# Patient Record
Sex: MALE | Race: WHITE
[De-identification: names, ages, dates, MRNs, and addresses within clinical notes are randomized per-mention and may not be internally consistent; named-entity substitution may affect disease eponyms.]

---

## 2018-01-24 ENCOUNTER — HOSPITAL ENCOUNTER (INPATIENT)
Dept: HOSPITAL 94 - ER | Age: 83
LOS: 2 days | Discharge: HOME | DRG: 682 | End: 2018-01-26
Attending: INTERNAL MEDICINE | Admitting: INTERNAL MEDICINE
Payer: MEDICARE

## 2018-01-24 VITALS — DIASTOLIC BLOOD PRESSURE: 51 MMHG | SYSTOLIC BLOOD PRESSURE: 105 MMHG

## 2018-01-24 VITALS — SYSTOLIC BLOOD PRESSURE: 106 MMHG | DIASTOLIC BLOOD PRESSURE: 55 MMHG

## 2018-01-24 VITALS — SYSTOLIC BLOOD PRESSURE: 108 MMHG | DIASTOLIC BLOOD PRESSURE: 71 MMHG

## 2018-01-24 VITALS — DIASTOLIC BLOOD PRESSURE: 44 MMHG | SYSTOLIC BLOOD PRESSURE: 101 MMHG

## 2018-01-24 VITALS — DIASTOLIC BLOOD PRESSURE: 60 MMHG | SYSTOLIC BLOOD PRESSURE: 122 MMHG

## 2018-01-24 VITALS — SYSTOLIC BLOOD PRESSURE: 108 MMHG | DIASTOLIC BLOOD PRESSURE: 55 MMHG

## 2018-01-24 VITALS — DIASTOLIC BLOOD PRESSURE: 61 MMHG | SYSTOLIC BLOOD PRESSURE: 112 MMHG

## 2018-01-24 VITALS — SYSTOLIC BLOOD PRESSURE: 123 MMHG | DIASTOLIC BLOOD PRESSURE: 63 MMHG

## 2018-01-24 VITALS — DIASTOLIC BLOOD PRESSURE: 61 MMHG | SYSTOLIC BLOOD PRESSURE: 107 MMHG

## 2018-01-24 VITALS — SYSTOLIC BLOOD PRESSURE: 113 MMHG | DIASTOLIC BLOOD PRESSURE: 50 MMHG

## 2018-01-24 VITALS — SYSTOLIC BLOOD PRESSURE: 100 MMHG | DIASTOLIC BLOOD PRESSURE: 35 MMHG

## 2018-01-24 VITALS — SYSTOLIC BLOOD PRESSURE: 107 MMHG | DIASTOLIC BLOOD PRESSURE: 46 MMHG

## 2018-01-24 VITALS — HEIGHT: 72 IN | WEIGHT: 210.32 LBS | BODY MASS INDEX: 28.49 KG/M2

## 2018-01-24 VITALS — DIASTOLIC BLOOD PRESSURE: 46 MMHG | SYSTOLIC BLOOD PRESSURE: 101 MMHG

## 2018-01-24 VITALS — DIASTOLIC BLOOD PRESSURE: 60 MMHG | SYSTOLIC BLOOD PRESSURE: 121 MMHG

## 2018-01-24 VITALS — SYSTOLIC BLOOD PRESSURE: 109 MMHG | DIASTOLIC BLOOD PRESSURE: 63 MMHG

## 2018-01-24 VITALS — DIASTOLIC BLOOD PRESSURE: 43 MMHG | SYSTOLIC BLOOD PRESSURE: 115 MMHG

## 2018-01-24 VITALS — SYSTOLIC BLOOD PRESSURE: 109 MMHG | DIASTOLIC BLOOD PRESSURE: 59 MMHG

## 2018-01-24 DIAGNOSIS — I50.9: ICD-10-CM

## 2018-01-24 DIAGNOSIS — D61.818: ICD-10-CM

## 2018-01-24 DIAGNOSIS — N17.9: Primary | ICD-10-CM

## 2018-01-24 DIAGNOSIS — N18.9: ICD-10-CM

## 2018-01-24 DIAGNOSIS — M19.90: ICD-10-CM

## 2018-01-24 DIAGNOSIS — D46.9: ICD-10-CM

## 2018-01-24 DIAGNOSIS — Z87.891: ICD-10-CM

## 2018-01-24 DIAGNOSIS — Z79.01: ICD-10-CM

## 2018-01-24 DIAGNOSIS — Z86.73: ICD-10-CM

## 2018-01-24 DIAGNOSIS — D62: ICD-10-CM

## 2018-01-24 DIAGNOSIS — D73.1: ICD-10-CM

## 2018-01-24 DIAGNOSIS — J44.9: ICD-10-CM

## 2018-01-24 DIAGNOSIS — D68.69: ICD-10-CM

## 2018-01-24 DIAGNOSIS — I48.91: ICD-10-CM

## 2018-01-24 DIAGNOSIS — Z88.8: ICD-10-CM

## 2018-01-24 DIAGNOSIS — K31.811: ICD-10-CM

## 2018-01-24 DIAGNOSIS — Z79.899: ICD-10-CM

## 2018-01-24 DIAGNOSIS — I13.0: ICD-10-CM

## 2018-01-24 LAB
ALBUMIN SERPL BCP-MCNC: 3.5 G/DL (ref 3.4–5)
ALBUMIN/GLOB SERPL: 1 {RATIO} (ref 1.1–1.5)
ALP SERPL-CCNC: 69 IU/L (ref 46–116)
ALT SERPL W P-5'-P-CCNC: 19 U/L (ref 12–78)
ANION GAP SERPL CALCULATED.3IONS-SCNC: 9 MMOL/L (ref 8–16)
AST SERPL W P-5'-P-CCNC: 17 U/L (ref 10–37)
BASOPHILS # BLD AUTO: 0 X10'3 (ref 0–0.2)
BASOPHILS NFR BLD AUTO: 0.1 % (ref 0–1)
BILIRUB SERPL-MCNC: 0.5 MG/DL (ref 0.1–1)
BUN SERPL-MCNC: 68 MG/DL (ref 7–18)
BUN/CREAT SERPL: 35.8 (ref 5.4–32)
CALCIUM SERPL-MCNC: 8.8 MG/DL (ref 8.5–10.1)
CHLORIDE SERPL-SCNC: 109 MMOL/L (ref 99–107)
CO2 SERPL-SCNC: 24.7 MMOL/L (ref 24–32)
CREAT SERPL-MCNC: 1.9 MG/DL (ref 0.6–1.1)
EOSINOPHIL # BLD AUTO: 0.1 X10'3 (ref 0–0.9)
EOSINOPHIL NFR BLD AUTO: 5.7 % (ref 0–6)
EOSINOPHIL NFR BLD MANUAL: 6 % (ref 0–6)
ERYTHROCYTE [DISTWIDTH] IN BLOOD BY AUTOMATED COUNT: 17.1 % (ref 11.5–14.5)
GFR SERPL CREATININE-BSD FRML MDRD: 34 ML/MIN
GLUCOSE SERPL-MCNC: 135 MG/DL (ref 70–104)
HCT VFR BLD AUTO: 18.1 % (ref 42–52)
HEMOCCULT STL QL: POSITIVE
HGB BLD-MCNC: 6.3 G/DL (ref 14–17.9)
INR PPP: 3.9 INR
LYMPHOCYTES # BLD AUTO: 0.2 X10'3 (ref 1.1–4.8)
LYMPHOCYTES NFR BLD AUTO: 8 % (ref 21–51)
LYMPHOCYTES NFR BLD MANUAL: 5 % (ref 21–51)
MCH RBC QN AUTO: 29.7 PG (ref 27–31)
MCHC RBC AUTO-ENTMCNC: 34.6 % (ref 33–36.5)
MCV RBC AUTO: 85.9 FL (ref 78–98)
MONOCYTES # BLD AUTO: 0.2 X10'3 (ref 0–0.9)
MONOCYTES NFR BLD AUTO: 8 % (ref 2–12)
MONOCYTES NFR BLD MANUAL: 5 % (ref 2–12)
NEUTROPHILS # BLD AUTO: 1.7 X10'3 (ref 1.8–7.7)
NEUTROPHILS NFR BLD AUTO: 78.2 % (ref 42–75)
NEUTS SEG NFR BLD MANUAL: 84 % (ref 42–75)
PLATELET # BLD AUTO: 119 X10'3 (ref 140–440)
PLATELET BLD QL SMEAR: (no result)
PMV BLD AUTO: 7.3 FL (ref 7.4–10.4)
POTASSIUM SERPL-SCNC: 4.7 MMOL/L (ref 3.5–5.1)
PROT SERPL-MCNC: 7 G/DL (ref 6.4–8.2)
PROTHROMBIN TIME: 38.6 SECONDS (ref 9–12)
RBC # BLD AUTO: 2.11 X10'6 (ref 4.7–6.1)
RBC MORPH BLD: NORMAL
SODIUM SERPL-SCNC: 143 MMOL/L (ref 135–145)
TOTAL CELLS COUNTED FLD: 100
WBC # BLD AUTO: 2.1 X10'3 (ref 4.5–11)

## 2018-01-24 PROCEDURE — 84484 ASSAY OF TROPONIN QUANT: CPT

## 2018-01-24 PROCEDURE — 83880 ASSAY OF NATRIURETIC PEPTIDE: CPT

## 2018-01-24 PROCEDURE — 82272 OCCULT BLD FECES 1-3 TESTS: CPT

## 2018-01-24 PROCEDURE — 86901 BLOOD TYPING SEROLOGIC RH(D): CPT

## 2018-01-24 PROCEDURE — 30233K1 TRANSFUSION OF NONAUTOLOGOUS FROZEN PLASMA INTO PERIPHERAL VEIN, PERCUTANEOUS APPROACH: ICD-10-PCS | Performed by: INTERNAL MEDICINE

## 2018-01-24 PROCEDURE — 0W3P8ZZ CONTROL BLEEDING IN GASTROINTESTINAL TRACT, VIA NATURAL OR ARTIFICIAL OPENING ENDOSCOPIC: ICD-10-PCS | Performed by: INTERNAL MEDICINE

## 2018-01-24 PROCEDURE — 36415 COLL VENOUS BLD VENIPUNCTURE: CPT

## 2018-01-24 PROCEDURE — 80053 COMPREHEN METABOLIC PANEL: CPT

## 2018-01-24 PROCEDURE — 85610 PROTHROMBIN TIME: CPT

## 2018-01-24 PROCEDURE — 99291 CRITICAL CARE FIRST HOUR: CPT

## 2018-01-24 PROCEDURE — 83735 ASSAY OF MAGNESIUM: CPT

## 2018-01-24 PROCEDURE — 86885 COOMBS TEST INDIRECT QUAL: CPT

## 2018-01-24 PROCEDURE — 87070 CULTURE OTHR SPECIMN AEROBIC: CPT

## 2018-01-24 PROCEDURE — 30233N1 TRANSFUSION OF NONAUTOLOGOUS RED BLOOD CELLS INTO PERIPHERAL VEIN, PERCUTANEOUS APPROACH: ICD-10-PCS | Performed by: INTERNAL MEDICINE

## 2018-01-24 PROCEDURE — 93005 ELECTROCARDIOGRAM TRACING: CPT

## 2018-01-24 PROCEDURE — 85025 COMPLETE CBC W/AUTO DIFF WBC: CPT

## 2018-01-24 PROCEDURE — 86920 COMPATIBILITY TEST SPIN: CPT

## 2018-01-24 PROCEDURE — 86900 BLOOD TYPING SEROLOGIC ABO: CPT

## 2018-01-24 PROCEDURE — 71045 X-RAY EXAM CHEST 1 VIEW: CPT

## 2018-01-24 PROCEDURE — 80048 BASIC METABOLIC PNL TOTAL CA: CPT

## 2018-01-24 RX ADMIN — LATANOPROST SCH DROP: 50 SOLUTION OPHTHALMIC at 21:00

## 2018-01-24 RX ADMIN — SODIUM CHLORIDE SCH MLS/HR: 9 INJECTION INTRAMUSCULAR; INTRAVENOUS; SUBCUTANEOUS at 16:24

## 2018-01-25 VITALS — SYSTOLIC BLOOD PRESSURE: 120 MMHG | DIASTOLIC BLOOD PRESSURE: 62 MMHG

## 2018-01-25 VITALS — DIASTOLIC BLOOD PRESSURE: 42 MMHG | SYSTOLIC BLOOD PRESSURE: 100 MMHG

## 2018-01-25 VITALS — SYSTOLIC BLOOD PRESSURE: 126 MMHG | DIASTOLIC BLOOD PRESSURE: 69 MMHG

## 2018-01-25 VITALS — DIASTOLIC BLOOD PRESSURE: 53 MMHG | SYSTOLIC BLOOD PRESSURE: 107 MMHG

## 2018-01-25 VITALS — SYSTOLIC BLOOD PRESSURE: 106 MMHG | DIASTOLIC BLOOD PRESSURE: 49 MMHG

## 2018-01-25 VITALS — DIASTOLIC BLOOD PRESSURE: 60 MMHG | SYSTOLIC BLOOD PRESSURE: 102 MMHG

## 2018-01-25 VITALS — SYSTOLIC BLOOD PRESSURE: 99 MMHG | DIASTOLIC BLOOD PRESSURE: 52 MMHG

## 2018-01-25 VITALS — DIASTOLIC BLOOD PRESSURE: 58 MMHG | SYSTOLIC BLOOD PRESSURE: 106 MMHG

## 2018-01-25 VITALS — DIASTOLIC BLOOD PRESSURE: 56 MMHG | SYSTOLIC BLOOD PRESSURE: 115 MMHG

## 2018-01-25 VITALS — SYSTOLIC BLOOD PRESSURE: 111 MMHG | DIASTOLIC BLOOD PRESSURE: 48 MMHG

## 2018-01-25 VITALS — SYSTOLIC BLOOD PRESSURE: 105 MMHG | DIASTOLIC BLOOD PRESSURE: 54 MMHG

## 2018-01-25 VITALS — DIASTOLIC BLOOD PRESSURE: 49 MMHG | SYSTOLIC BLOOD PRESSURE: 105 MMHG

## 2018-01-25 VITALS — DIASTOLIC BLOOD PRESSURE: 70 MMHG | SYSTOLIC BLOOD PRESSURE: 140 MMHG

## 2018-01-25 VITALS — SYSTOLIC BLOOD PRESSURE: 109 MMHG | DIASTOLIC BLOOD PRESSURE: 55 MMHG

## 2018-01-25 VITALS — SYSTOLIC BLOOD PRESSURE: 105 MMHG | DIASTOLIC BLOOD PRESSURE: 53 MMHG

## 2018-01-25 LAB
ALBUMIN SERPL BCP-MCNC: 3.1 G/DL (ref 3.4–5)
ANION GAP SERPL CALCULATED.3IONS-SCNC: 9 MMOL/L (ref 8–16)
ANISOCYTOSIS BLD QL SMEAR: (no result)
BASOPHILS # BLD AUTO: 0 X10'3 (ref 0–0.2)
BASOPHILS NFR BLD AUTO: 0.1 % (ref 0–1)
BUN SERPL-MCNC: 64 MG/DL (ref 7–18)
BUN/CREAT SERPL: 35.6 (ref 5.4–32)
CALCIUM SERPL-MCNC: 8.6 MG/DL (ref 8.5–10.1)
CHLORIDE SERPL-SCNC: 112 MMOL/L (ref 99–107)
CO2 SERPL-SCNC: 24.2 MMOL/L (ref 24–32)
CREAT SERPL-MCNC: 1.8 MG/DL (ref 0.6–1.1)
EOSINOPHIL # BLD AUTO: 0.1 X10'3 (ref 0–0.9)
EOSINOPHIL NFR BLD AUTO: 5.7 % (ref 0–6)
EOSINOPHIL NFR BLD MANUAL: 6 % (ref 0–6)
ERYTHROCYTE [DISTWIDTH] IN BLOOD BY AUTOMATED COUNT: 16.6 % (ref 11.5–14.5)
GFR SERPL CREATININE-BSD FRML MDRD: 36 ML/MIN
GLUCOSE SERPL-MCNC: 89 MG/DL (ref 70–104)
HCT VFR BLD AUTO: 20.5 % (ref 42–52)
HGB BLD-MCNC: 6.9 G/DL (ref 14–17.9)
LYMPHOCYTES # BLD AUTO: 0.2 X10'3 (ref 1.1–4.8)
LYMPHOCYTES NFR BLD AUTO: 8.6 % (ref 21–51)
LYMPHOCYTES NFR BLD MANUAL: 6 % (ref 21–51)
MAGNESIUM SERPL-MCNC: 2.3 MG/DL (ref 1.5–2.4)
MCH RBC QN AUTO: 29.7 PG (ref 27–31)
MCHC RBC AUTO-ENTMCNC: 33.4 % (ref 33–36.5)
MCV RBC AUTO: 89 FL (ref 78–98)
MONOCYTES # BLD AUTO: 0.2 X10'3 (ref 0–0.9)
MONOCYTES NFR BLD AUTO: 11.4 % (ref 2–12)
MONOCYTES NFR BLD MANUAL: 15 % (ref 2–12)
NEUTROPHILS # BLD AUTO: 1.5 X10'3 (ref 1.8–7.7)
NEUTROPHILS NFR BLD AUTO: 74.2 % (ref 42–75)
NEUTS SEG NFR BLD MANUAL: 73 % (ref 42–75)
PLATELET # BLD AUTO: 95 X10'3 (ref 140–440)
PLATELET BLD QL SMEAR: (no result)
PMV BLD AUTO: 7.5 FL (ref 7.4–10.4)
POTASSIUM SERPL-SCNC: 5.1 MMOL/L (ref 3.5–5.1)
RBC # BLD AUTO: 2.31 X10'6 (ref 4.7–6.1)
RBC MORPH BLD: (no result)
SMUDGE CELLS BLD QL SMEAR: (no result)
SODIUM SERPL-SCNC: 145 MMOL/L (ref 135–145)
TOTAL CELLS COUNTED FLD: 100
WBC # BLD AUTO: 2 X10'3 (ref 4.5–11)

## 2018-01-25 RX ADMIN — SODIUM CHLORIDE SCH MLS/HR: 9 INJECTION INTRAMUSCULAR; INTRAVENOUS; SUBCUTANEOUS at 12:44

## 2018-01-25 RX ADMIN — LATANOPROST SCH DROP: 50 SOLUTION OPHTHALMIC at 20:38

## 2018-01-25 RX ADMIN — SODIUM CHLORIDE SCH MLS/HR: 9 INJECTION INTRAMUSCULAR; INTRAVENOUS; SUBCUTANEOUS at 23:44

## 2018-01-26 VITALS — DIASTOLIC BLOOD PRESSURE: 70 MMHG | SYSTOLIC BLOOD PRESSURE: 134 MMHG

## 2018-01-26 VITALS — SYSTOLIC BLOOD PRESSURE: 118 MMHG | DIASTOLIC BLOOD PRESSURE: 55 MMHG

## 2018-01-26 VITALS — DIASTOLIC BLOOD PRESSURE: 63 MMHG | SYSTOLIC BLOOD PRESSURE: 113 MMHG

## 2018-01-26 LAB
ALBUMIN SERPL BCP-MCNC: 3.3 G/DL (ref 3.4–5)
ANION GAP SERPL CALCULATED.3IONS-SCNC: 10 MMOL/L (ref 8–16)
ANISOCYTOSIS BLD QL SMEAR: (no result)
BASOPHILS # BLD AUTO: (no result) X10'3 (ref 0–0.2)
BASOPHILS NFR BLD AUTO: (no result) % (ref 0–1)
BUN SERPL-MCNC: 50 MG/DL (ref 7–18)
BUN/CREAT SERPL: 27.8 (ref 5.4–32)
CALCIUM SERPL-MCNC: 8.9 MG/DL (ref 8.5–10.1)
CHLORIDE SERPL-SCNC: 115 MMOL/L (ref 99–107)
CO2 SERPL-SCNC: 22.5 MMOL/L (ref 24–32)
CREAT SERPL-MCNC: 1.8 MG/DL (ref 0.6–1.1)
EOSINOPHIL # BLD AUTO: (no result) X10'3 (ref 0–0.9)
EOSINOPHIL NFR BLD AUTO: (no result) % (ref 0–6)
ERYTHROCYTE [DISTWIDTH] IN BLOOD BY AUTOMATED COUNT: 16.8 % (ref 11.5–14.5)
GFR SERPL CREATININE-BSD FRML MDRD: 36 ML/MIN
GLUCOSE SERPL-MCNC: 92 MG/DL (ref 70–104)
HCT VFR BLD AUTO: 26.3 % (ref 42–52)
HGB BLD-MCNC: 9.1 G/DL (ref 14–17.9)
INR PPP: 2.7 INR
LYMPHOCYTES # BLD AUTO: (no result) X10'3 (ref 1.1–4.8)
LYMPHOCYTES NFR BLD AUTO: (no result) % (ref 21–51)
LYMPHOCYTES NFR BLD MANUAL: 6 % (ref 21–51)
MAGNESIUM SERPL-MCNC: 2.2 MG/DL (ref 1.5–2.4)
MCH RBC QN AUTO: 30.4 PG (ref 27–31)
MCHC RBC AUTO-ENTMCNC: 34.5 % (ref 33–36.5)
MCV RBC AUTO: 88.1 FL (ref 78–98)
MONOCYTES # BLD AUTO: (no result) X10'3 (ref 0–0.9)
MONOCYTES NFR BLD AUTO: (no result) % (ref 2–12)
MONOCYTES NFR BLD MANUAL: 4 % (ref 2–12)
NEUTROPHILS # BLD AUTO: (no result) X10'3 (ref 1.8–7.7)
NEUTROPHILS NFR BLD AUTO: (no result) % (ref 42–75)
NEUTS SEG NFR BLD MANUAL: 90 % (ref 42–75)
PLATELET # BLD AUTO: 96 X10'3 (ref 140–440)
PLATELET BLD QL SMEAR: (no result)
PMV BLD AUTO: 7.6 FL (ref 7.4–10.4)
POTASSIUM SERPL-SCNC: 4.8 MMOL/L (ref 3.5–5.1)
PROTHROMBIN TIME: 27.2 SECONDS (ref 9–12)
RBC # BLD AUTO: 2.98 X10'6 (ref 4.7–6.1)
RBC MORPH BLD: (no result)
SODIUM SERPL-SCNC: 147 MMOL/L (ref 135–145)
TOTAL CELLS COUNTED FLD: 100
WBC # BLD AUTO: 3 X10'3 (ref 4.5–11)

## 2018-01-26 RX ADMIN — SODIUM CHLORIDE SCH MLS/HR: 9 INJECTION INTRAMUSCULAR; INTRAVENOUS; SUBCUTANEOUS at 04:01

## 2018-01-26 RX ADMIN — SODIUM CHLORIDE SCH MLS/HR: 9 INJECTION INTRAMUSCULAR; INTRAVENOUS; SUBCUTANEOUS at 11:15

## 2018-07-12 ENCOUNTER — HOSPITAL ENCOUNTER (OUTPATIENT)
Dept: HOSPITAL 94 - SSTAY O | Age: 83
Discharge: HOME | End: 2018-07-12
Attending: INTERNAL MEDICINE
Payer: MEDICARE

## 2018-07-12 VITALS — DIASTOLIC BLOOD PRESSURE: 60 MMHG | SYSTOLIC BLOOD PRESSURE: 97 MMHG

## 2018-07-12 VITALS — HEIGHT: 72 IN | WEIGHT: 215.61 LBS | BODY MASS INDEX: 29.2 KG/M2

## 2018-07-12 VITALS — DIASTOLIC BLOOD PRESSURE: 60 MMHG | SYSTOLIC BLOOD PRESSURE: 104 MMHG

## 2018-07-12 VITALS — SYSTOLIC BLOOD PRESSURE: 93 MMHG | DIASTOLIC BLOOD PRESSURE: 57 MMHG

## 2018-07-12 VITALS — SYSTOLIC BLOOD PRESSURE: 105 MMHG | DIASTOLIC BLOOD PRESSURE: 54 MMHG

## 2018-07-12 VITALS — DIASTOLIC BLOOD PRESSURE: 62 MMHG | SYSTOLIC BLOOD PRESSURE: 123 MMHG

## 2018-07-12 VITALS — SYSTOLIC BLOOD PRESSURE: 97 MMHG | DIASTOLIC BLOOD PRESSURE: 60 MMHG

## 2018-07-12 VITALS — SYSTOLIC BLOOD PRESSURE: 118 MMHG | DIASTOLIC BLOOD PRESSURE: 62 MMHG

## 2018-07-12 VITALS — SYSTOLIC BLOOD PRESSURE: 104 MMHG | DIASTOLIC BLOOD PRESSURE: 56 MMHG

## 2018-07-12 VITALS — SYSTOLIC BLOOD PRESSURE: 106 MMHG | DIASTOLIC BLOOD PRESSURE: 63 MMHG

## 2018-07-12 DIAGNOSIS — Z98.42: ICD-10-CM

## 2018-07-12 DIAGNOSIS — Z79.01: ICD-10-CM

## 2018-07-12 DIAGNOSIS — Z79.899: ICD-10-CM

## 2018-07-12 DIAGNOSIS — Z98.890: ICD-10-CM

## 2018-07-12 DIAGNOSIS — N40.0: ICD-10-CM

## 2018-07-12 DIAGNOSIS — M19.90: ICD-10-CM

## 2018-07-12 DIAGNOSIS — I48.91: ICD-10-CM

## 2018-07-12 DIAGNOSIS — D46.20: Primary | ICD-10-CM

## 2018-07-12 DIAGNOSIS — K21.9: ICD-10-CM

## 2018-07-12 DIAGNOSIS — I11.0: ICD-10-CM

## 2018-07-12 DIAGNOSIS — Z88.8: ICD-10-CM

## 2018-07-12 DIAGNOSIS — I50.9: ICD-10-CM

## 2018-07-12 DIAGNOSIS — Z72.89: ICD-10-CM

## 2018-07-12 DIAGNOSIS — Z87.891: ICD-10-CM

## 2018-07-12 DIAGNOSIS — F32.9: ICD-10-CM

## 2018-07-12 DIAGNOSIS — Z86.73: ICD-10-CM

## 2018-07-12 DIAGNOSIS — Z95.5: ICD-10-CM

## 2018-07-12 DIAGNOSIS — G47.33: ICD-10-CM

## 2018-07-12 DIAGNOSIS — J44.9: ICD-10-CM

## 2018-07-12 DIAGNOSIS — Z98.41: ICD-10-CM

## 2018-07-12 PROCEDURE — 86644 CMV ANTIBODY: CPT

## 2018-07-12 PROCEDURE — 86901 BLOOD TYPING SEROLOGIC RH(D): CPT

## 2018-07-12 PROCEDURE — 86885 COOMBS TEST INDIRECT QUAL: CPT

## 2018-07-12 PROCEDURE — 36430 TRANSFUSION BLD/BLD COMPNT: CPT

## 2018-07-12 PROCEDURE — 86922 COMPATIBILITY TEST ANTIGLOB: CPT

## 2018-07-12 PROCEDURE — 86945 BLOOD PRODUCT/IRRADIATION: CPT

## 2018-07-12 PROCEDURE — 36415 COLL VENOUS BLD VENIPUNCTURE: CPT

## 2018-07-12 PROCEDURE — 86900 BLOOD TYPING SEROLOGIC ABO: CPT

## 2018-08-13 ENCOUNTER — HOSPITAL ENCOUNTER (INPATIENT)
Dept: HOSPITAL 94 - ER | Age: 83
LOS: 4 days | Discharge: NURSING, REHAB OR LONG-TERM CARE | DRG: 683 | End: 2018-08-17
Attending: FAMILY MEDICINE | Admitting: INTERNAL MEDICINE
Payer: MEDICARE

## 2018-08-13 VITALS — SYSTOLIC BLOOD PRESSURE: 124 MMHG | DIASTOLIC BLOOD PRESSURE: 41 MMHG

## 2018-08-13 VITALS — SYSTOLIC BLOOD PRESSURE: 108 MMHG | DIASTOLIC BLOOD PRESSURE: 93 MMHG

## 2018-08-13 VITALS — HEIGHT: 73 IN | BODY MASS INDEX: 25.63 KG/M2 | WEIGHT: 193.41 LBS

## 2018-08-13 DIAGNOSIS — I13.0: ICD-10-CM

## 2018-08-13 DIAGNOSIS — C84.10: ICD-10-CM

## 2018-08-13 DIAGNOSIS — I47.2: ICD-10-CM

## 2018-08-13 DIAGNOSIS — C84.09: ICD-10-CM

## 2018-08-13 DIAGNOSIS — R33.9: ICD-10-CM

## 2018-08-13 DIAGNOSIS — N18.4: ICD-10-CM

## 2018-08-13 DIAGNOSIS — D46.9: ICD-10-CM

## 2018-08-13 DIAGNOSIS — J44.9: ICD-10-CM

## 2018-08-13 DIAGNOSIS — I48.91: ICD-10-CM

## 2018-08-13 DIAGNOSIS — Z79.01: ICD-10-CM

## 2018-08-13 DIAGNOSIS — D61.818: ICD-10-CM

## 2018-08-13 DIAGNOSIS — Z86.73: ICD-10-CM

## 2018-08-13 DIAGNOSIS — H91.90: ICD-10-CM

## 2018-08-13 DIAGNOSIS — I95.9: ICD-10-CM

## 2018-08-13 DIAGNOSIS — I50.30: ICD-10-CM

## 2018-08-13 DIAGNOSIS — N17.9: Primary | ICD-10-CM

## 2018-08-13 DIAGNOSIS — L29.9: ICD-10-CM

## 2018-08-13 DIAGNOSIS — Z79.899: ICD-10-CM

## 2018-08-13 LAB
ALBUMIN SERPL BCP-MCNC: 3.4 G/DL (ref 3.4–5)
ALBUMIN/GLOB SERPL: 0.8 {RATIO} (ref 1.1–1.5)
ALP SERPL-CCNC: 99 IU/L (ref 46–116)
ALT SERPL W P-5'-P-CCNC: 21 U/L (ref 12–78)
ANION GAP SERPL CALCULATED.3IONS-SCNC: 10 MMOL/L (ref 8–16)
AST SERPL W P-5'-P-CCNC: 21 U/L (ref 10–37)
BACTERIA URNS QL MICRO: (no result) /HPF
BASOPHILS # BLD AUTO: 0 X10'3 (ref 0–0.2)
BASOPHILS NFR BLD AUTO: 0.1 % (ref 0–1)
BILIRUB SERPL-MCNC: 0.7 MG/DL (ref 0.1–1)
BUN SERPL-MCNC: 132 MG/DL (ref 7–18)
BUN/CREAT SERPL: 43.1 (ref 5.4–32)
CALCIUM SERPL-MCNC: 9.1 MG/DL (ref 8.5–10.1)
CHLORIDE SERPL-SCNC: 106 MMOL/L (ref 99–107)
CLARITY UR: CLEAR
CO2 SERPL-SCNC: 21.1 MMOL/L (ref 24–32)
COLOR UR: YELLOW
CREAT SERPL-MCNC: 3.06 MG/DL (ref 0.6–1.1)
DEPRECATED SQUAMOUS URNS QL MICRO: (no result) /LPF
EOSINOPHIL # BLD AUTO: 2.3 X10'3 (ref 0–0.9)
EOSINOPHIL NFR BLD AUTO: 39.1 % (ref 0–6)
ERYTHROCYTE [DISTWIDTH] IN BLOOD BY AUTOMATED COUNT: 16.8 % (ref 11.5–14.5)
GFR SERPL CREATININE-BSD FRML MDRD: 19 ML/MIN
GLUCOSE SERPL-MCNC: 115 MG/DL (ref 70–104)
GLUCOSE UR STRIP-MCNC: NEGATIVE MG/DL
HCT VFR BLD AUTO: 27.4 % (ref 42–52)
HGB BLD-MCNC: 9.3 G/DL (ref 14–17.9)
HGB UR QL STRIP: NEGATIVE
HYALINE CASTS URNS QL MICRO: (no result) /LPF
KETONES UR STRIP-MCNC: NEGATIVE MG/DL
LEUKOCYTE ESTERASE UR QL STRIP: (no result)
LYMPHOCYTES # BLD AUTO: 0.2 X10'3 (ref 1.1–4.8)
LYMPHOCYTES NFR BLD AUTO: 3.1 % (ref 21–51)
MCH RBC QN AUTO: 29.8 PG (ref 27–31)
MCHC RBC AUTO-ENTMCNC: 33.8 % (ref 33–36.5)
MCV RBC AUTO: 88.1 FL (ref 78–98)
MONOCYTES # BLD AUTO: 0.4 X10'3 (ref 0–0.9)
MONOCYTES NFR BLD AUTO: 6.4 % (ref 2–12)
NEUTROPHILS # BLD AUTO: 3.1 X10'3 (ref 1.8–7.7)
NEUTROPHILS NFR BLD AUTO: 51.3 % (ref 42–75)
NITRITE UR QL STRIP: NEGATIVE
PH UR STRIP: 5.5 [PH] (ref 4.8–8)
PLATELET # BLD AUTO: 103 X10'3 (ref 140–440)
PMV BLD AUTO: 7.4 FL (ref 7.4–10.4)
POTASSIUM SERPL-SCNC: 5.3 MMOL/L (ref 3.5–5.1)
PROT SERPL-MCNC: 7.5 G/DL (ref 6.4–8.2)
PROT UR QL STRIP: NEGATIVE MG/DL
RBC # BLD AUTO: 3.11 X10'6 (ref 4.7–6.1)
RBC #/AREA URNS HPF: (no result) /HPF (ref 0–2)
SODIUM SERPL-SCNC: 137 MMOL/L (ref 135–145)
SP GR UR STRIP: 1.01 (ref 1–1.03)
URN COLLECT METHOD CLASS: (no result)
UROBILINOGEN UR STRIP-MCNC: 0.2 E.U/DL (ref 0.2–1)
WBC # BLD AUTO: 5.9 X10'3 (ref 4.5–11)
WBC #/AREA URNS HPF: (no result) /HPF (ref 0–4)
WBC CLUMPS #/AREA URNS HPF: (no result) /HPF

## 2018-08-13 PROCEDURE — 97161 PT EVAL LOW COMPLEX 20 MIN: CPT

## 2018-08-13 PROCEDURE — 84100 ASSAY OF PHOSPHORUS: CPT

## 2018-08-13 PROCEDURE — 86920 COMPATIBILITY TEST SPIN: CPT

## 2018-08-13 PROCEDURE — 71045 X-RAY EXAM CHEST 1 VIEW: CPT

## 2018-08-13 PROCEDURE — 85610 PROTHROMBIN TIME: CPT

## 2018-08-13 PROCEDURE — 87040 BLOOD CULTURE FOR BACTERIA: CPT

## 2018-08-13 PROCEDURE — 86900 BLOOD TYPING SEROLOGIC ABO: CPT

## 2018-08-13 PROCEDURE — 97116 GAIT TRAINING THERAPY: CPT

## 2018-08-13 PROCEDURE — 86644 CMV ANTIBODY: CPT

## 2018-08-13 PROCEDURE — 93306 TTE W/DOPPLER COMPLETE: CPT

## 2018-08-13 PROCEDURE — 84484 ASSAY OF TROPONIN QUANT: CPT

## 2018-08-13 PROCEDURE — 86885 COOMBS TEST INDIRECT QUAL: CPT

## 2018-08-13 PROCEDURE — 85025 COMPLETE CBC W/AUTO DIFF WBC: CPT

## 2018-08-13 PROCEDURE — 96365 THER/PROPH/DIAG IV INF INIT: CPT

## 2018-08-13 PROCEDURE — 83735 ASSAY OF MAGNESIUM: CPT

## 2018-08-13 PROCEDURE — 86901 BLOOD TYPING SEROLOGIC RH(D): CPT

## 2018-08-13 PROCEDURE — 86945 BLOOD PRODUCT/IRRADIATION: CPT

## 2018-08-13 PROCEDURE — 81001 URINALYSIS AUTO W/SCOPE: CPT

## 2018-08-13 PROCEDURE — 83605 ASSAY OF LACTIC ACID: CPT

## 2018-08-13 PROCEDURE — 36415 COLL VENOUS BLD VENIPUNCTURE: CPT

## 2018-08-13 PROCEDURE — 80053 COMPREHEN METABOLIC PANEL: CPT

## 2018-08-13 PROCEDURE — 87070 CULTURE OTHR SPECIMN AEROBIC: CPT

## 2018-08-13 PROCEDURE — 87088 URINE BACTERIA CULTURE: CPT

## 2018-08-13 PROCEDURE — 93005 ELECTROCARDIOGRAM TRACING: CPT

## 2018-08-13 PROCEDURE — 99285 EMERGENCY DEPT VISIT HI MDM: CPT

## 2018-08-13 PROCEDURE — 76775 US EXAM ABDO BACK WALL LIM: CPT

## 2018-08-13 PROCEDURE — 97530 THERAPEUTIC ACTIVITIES: CPT

## 2018-08-13 RX ADMIN — SODIUM CHLORIDE SCH MLS/HR: 9 INJECTION INTRAMUSCULAR; INTRAVENOUS; SUBCUTANEOUS at 18:50

## 2018-08-14 VITALS — SYSTOLIC BLOOD PRESSURE: 125 MMHG | DIASTOLIC BLOOD PRESSURE: 53 MMHG

## 2018-08-14 VITALS — DIASTOLIC BLOOD PRESSURE: 41 MMHG | SYSTOLIC BLOOD PRESSURE: 103 MMHG

## 2018-08-14 VITALS — SYSTOLIC BLOOD PRESSURE: 138 MMHG | DIASTOLIC BLOOD PRESSURE: 53 MMHG

## 2018-08-14 VITALS — DIASTOLIC BLOOD PRESSURE: 38 MMHG | SYSTOLIC BLOOD PRESSURE: 106 MMHG

## 2018-08-14 VITALS — DIASTOLIC BLOOD PRESSURE: 67 MMHG | SYSTOLIC BLOOD PRESSURE: 138 MMHG

## 2018-08-14 VITALS — SYSTOLIC BLOOD PRESSURE: 112 MMHG | DIASTOLIC BLOOD PRESSURE: 54 MMHG

## 2018-08-14 LAB
ALBUMIN SERPL BCP-MCNC: 2.9 G/DL (ref 3.4–5)
ALBUMIN/GLOB SERPL: 0.8 {RATIO} (ref 1.1–1.5)
ALP SERPL-CCNC: 82 IU/L (ref 46–116)
ALT SERPL W P-5'-P-CCNC: 20 U/L (ref 12–78)
ANION GAP SERPL CALCULATED.3IONS-SCNC: 11 MMOL/L (ref 8–16)
AST SERPL W P-5'-P-CCNC: 19 U/L (ref 10–37)
BASOPHILS # BLD AUTO: 0 X10'3 (ref 0–0.2)
BASOPHILS NFR BLD AUTO: 0 % (ref 0–1)
BILIRUB SERPL-MCNC: 0.7 MG/DL (ref 0.1–1)
BUN SERPL-MCNC: 123 MG/DL (ref 7–18)
BUN/CREAT SERPL: 45.9 (ref 5.4–32)
CALCIUM SERPL-MCNC: 8.6 MG/DL (ref 8.5–10.1)
CHLORIDE SERPL-SCNC: 112 MMOL/L (ref 99–107)
CO2 SERPL-SCNC: 17.7 MMOL/L (ref 24–32)
CREAT SERPL-MCNC: 2.68 MG/DL (ref 0.6–1.1)
EOSINOPHIL # BLD AUTO: 1.1 X10'3 (ref 0–0.9)
EOSINOPHIL NFR BLD AUTO: 19.7 % (ref 0–6)
ERYTHROCYTE [DISTWIDTH] IN BLOOD BY AUTOMATED COUNT: 17.4 % (ref 11.5–14.5)
GFR SERPL CREATININE-BSD FRML MDRD: 23 ML/MIN
GLUCOSE SERPL-MCNC: 105 MG/DL (ref 70–104)
HCT VFR BLD AUTO: 25.4 % (ref 42–52)
HGB BLD-MCNC: 8.6 G/DL (ref 14–17.9)
INR PPP: 2.8 INR
LYMPHOCYTES # BLD AUTO: 0.2 X10'3 (ref 1.1–4.8)
LYMPHOCYTES NFR BLD AUTO: 3 % (ref 21–51)
MCH RBC QN AUTO: 29.7 PG (ref 27–31)
MCHC RBC AUTO-ENTMCNC: 33.8 % (ref 33–36.5)
MCV RBC AUTO: 88 FL (ref 78–98)
MONOCYTES # BLD AUTO: 0.4 X10'3 (ref 0–0.9)
MONOCYTES NFR BLD AUTO: 7.7 % (ref 2–12)
NEUTROPHILS # BLD AUTO: 3.8 X10'3 (ref 1.8–7.7)
NEUTROPHILS NFR BLD AUTO: 69.6 % (ref 42–75)
PLATELET # BLD AUTO: 88 X10'3 (ref 140–440)
PMV BLD AUTO: 7.6 FL (ref 7.4–10.4)
POTASSIUM SERPL-SCNC: 5.2 MMOL/L (ref 3.5–5.1)
PROT SERPL-MCNC: 6.4 G/DL (ref 6.4–8.2)
PROTHROMBIN TIME: 28 SECONDS (ref 9–12)
RBC # BLD AUTO: 2.89 X10'6 (ref 4.7–6.1)
SODIUM SERPL-SCNC: 141 MMOL/L (ref 135–145)
TROPONIN I SERPL-MCNC: < 0.04 NG/ML (ref 0–0.05)
WBC # BLD AUTO: 5.5 X10'3 (ref 4.5–11)

## 2018-08-14 RX ADMIN — SODIUM CHLORIDE SCH MLS/HR: 9 INJECTION INTRAMUSCULAR; INTRAVENOUS; SUBCUTANEOUS at 14:50

## 2018-08-14 RX ADMIN — BRIMONIDINE TARTRATE SCH DROP: 2 SOLUTION/ DROPS OPHTHALMIC at 21:44

## 2018-08-14 RX ADMIN — SODIUM CHLORIDE SCH MLS/HR: 9 INJECTION INTRAMUSCULAR; INTRAVENOUS; SUBCUTANEOUS at 04:50

## 2018-08-15 VITALS — DIASTOLIC BLOOD PRESSURE: 65 MMHG | SYSTOLIC BLOOD PRESSURE: 133 MMHG

## 2018-08-15 VITALS — DIASTOLIC BLOOD PRESSURE: 44 MMHG | SYSTOLIC BLOOD PRESSURE: 113 MMHG

## 2018-08-15 VITALS — SYSTOLIC BLOOD PRESSURE: 102 MMHG | DIASTOLIC BLOOD PRESSURE: 43 MMHG

## 2018-08-15 VITALS — DIASTOLIC BLOOD PRESSURE: 69 MMHG | SYSTOLIC BLOOD PRESSURE: 129 MMHG

## 2018-08-15 VITALS — SYSTOLIC BLOOD PRESSURE: 111 MMHG | DIASTOLIC BLOOD PRESSURE: 37 MMHG

## 2018-08-15 VITALS — DIASTOLIC BLOOD PRESSURE: 71 MMHG | SYSTOLIC BLOOD PRESSURE: 111 MMHG

## 2018-08-15 VITALS — SYSTOLIC BLOOD PRESSURE: 104 MMHG | DIASTOLIC BLOOD PRESSURE: 41 MMHG

## 2018-08-15 VITALS — SYSTOLIC BLOOD PRESSURE: 138 MMHG | DIASTOLIC BLOOD PRESSURE: 44 MMHG

## 2018-08-15 VITALS — DIASTOLIC BLOOD PRESSURE: 54 MMHG | SYSTOLIC BLOOD PRESSURE: 110 MMHG

## 2018-08-15 VITALS — DIASTOLIC BLOOD PRESSURE: 49 MMHG | SYSTOLIC BLOOD PRESSURE: 105 MMHG

## 2018-08-15 VITALS — SYSTOLIC BLOOD PRESSURE: 133 MMHG | DIASTOLIC BLOOD PRESSURE: 65 MMHG

## 2018-08-15 VITALS — DIASTOLIC BLOOD PRESSURE: 49 MMHG | SYSTOLIC BLOOD PRESSURE: 115 MMHG

## 2018-08-15 LAB
ALBUMIN SERPL BCP-MCNC: 2.8 G/DL (ref 3.4–5)
ALBUMIN/GLOB SERPL: 0.8 {RATIO} (ref 1.1–1.5)
ALP SERPL-CCNC: 87 IU/L (ref 46–116)
ALT SERPL W P-5'-P-CCNC: 22 U/L (ref 12–78)
ANION GAP SERPL CALCULATED.3IONS-SCNC: 10 MMOL/L (ref 8–16)
AST SERPL W P-5'-P-CCNC: 31 U/L (ref 10–37)
BASOPHILS # BLD AUTO: 0 X10'3 (ref 0–0.2)
BASOPHILS NFR BLD AUTO: 0.2 % (ref 0–1)
BILIRUB SERPL-MCNC: 0.4 MG/DL (ref 0.1–1)
BUN SERPL-MCNC: 98 MG/DL (ref 7–18)
BUN/CREAT SERPL: 48 (ref 5.4–32)
CALCIUM SERPL-MCNC: 9 MG/DL (ref 8.5–10.1)
CHLORIDE SERPL-SCNC: 112 MMOL/L (ref 99–107)
CO2 SERPL-SCNC: 18.7 MMOL/L (ref 24–32)
CREAT SERPL-MCNC: 2.04 MG/DL (ref 0.6–1.1)
EOSINOPHIL # BLD AUTO: 1.7 X10'3 (ref 0–0.9)
EOSINOPHIL NFR BLD AUTO: 34.7 % (ref 0–6)
ERYTHROCYTE [DISTWIDTH] IN BLOOD BY AUTOMATED COUNT: 17.8 % (ref 11.5–14.5)
GFR SERPL CREATININE-BSD FRML MDRD: 31 ML/MIN
GLUCOSE SERPL-MCNC: 93 MG/DL (ref 70–104)
HCT VFR BLD AUTO: 26.4 % (ref 42–52)
HGB BLD-MCNC: 8.9 G/DL (ref 14–17.9)
INR PPP: 2.7 INR
LYMPHOCYTES # BLD AUTO: 0.2 X10'3 (ref 1.1–4.8)
LYMPHOCYTES NFR BLD AUTO: 3.4 % (ref 21–51)
MCH RBC QN AUTO: 29.7 PG (ref 27–31)
MCHC RBC AUTO-ENTMCNC: 33.7 % (ref 33–36.5)
MCV RBC AUTO: 88 FL (ref 78–98)
MONOCYTES # BLD AUTO: 0.3 X10'3 (ref 0–0.9)
MONOCYTES NFR BLD AUTO: 6.9 % (ref 2–12)
NEUTROPHILS # BLD AUTO: 2.7 X10'3 (ref 1.8–7.7)
NEUTROPHILS NFR BLD AUTO: 54.8 % (ref 42–75)
PLATELET # BLD AUTO: 71 X10'3 (ref 140–440)
PMV BLD AUTO: 7.6 FL (ref 7.4–10.4)
POTASSIUM SERPL-SCNC: 5.1 MMOL/L (ref 3.5–5.1)
PROT SERPL-MCNC: 6.4 G/DL (ref 6.4–8.2)
PROTHROMBIN TIME: 27.1 SECONDS (ref 9–12)
RBC # BLD AUTO: 3 X10'6 (ref 4.7–6.1)
SODIUM SERPL-SCNC: 141 MMOL/L (ref 135–145)
WBC # BLD AUTO: 4.9 X10'3 (ref 4.5–11)

## 2018-08-15 PROCEDURE — 30233N1 TRANSFUSION OF NONAUTOLOGOUS RED BLOOD CELLS INTO PERIPHERAL VEIN, PERCUTANEOUS APPROACH: ICD-10-PCS | Performed by: FAMILY MEDICINE

## 2018-08-15 RX ADMIN — SODIUM CHLORIDE SCH MLS/HR: 9 INJECTION INTRAMUSCULAR; INTRAVENOUS; SUBCUTANEOUS at 20:10

## 2018-08-15 RX ADMIN — SODIUM CHLORIDE SCH MLS/HR: 9 INJECTION INTRAMUSCULAR; INTRAVENOUS; SUBCUTANEOUS at 00:37

## 2018-08-15 RX ADMIN — BRIMONIDINE TARTRATE SCH DROP: 2 SOLUTION/ DROPS OPHTHALMIC at 21:55

## 2018-08-15 RX ADMIN — PANTOPRAZOLE SODIUM SCH MG: 40 TABLET, DELAYED RELEASE ORAL at 08:26

## 2018-08-15 RX ADMIN — SODIUM CHLORIDE SCH MLS/HR: 9 INJECTION INTRAMUSCULAR; INTRAVENOUS; SUBCUTANEOUS at 10:00

## 2018-08-16 VITALS — SYSTOLIC BLOOD PRESSURE: 113 MMHG | DIASTOLIC BLOOD PRESSURE: 62 MMHG

## 2018-08-16 VITALS — SYSTOLIC BLOOD PRESSURE: 124 MMHG | DIASTOLIC BLOOD PRESSURE: 49 MMHG

## 2018-08-16 VITALS — SYSTOLIC BLOOD PRESSURE: 111 MMHG | DIASTOLIC BLOOD PRESSURE: 39 MMHG

## 2018-08-16 VITALS — DIASTOLIC BLOOD PRESSURE: 76 MMHG | SYSTOLIC BLOOD PRESSURE: 161 MMHG

## 2018-08-16 VITALS — SYSTOLIC BLOOD PRESSURE: 135 MMHG | DIASTOLIC BLOOD PRESSURE: 55 MMHG

## 2018-08-16 VITALS — SYSTOLIC BLOOD PRESSURE: 127 MMHG | DIASTOLIC BLOOD PRESSURE: 48 MMHG

## 2018-08-16 LAB
ALBUMIN SERPL BCP-MCNC: 2.8 G/DL (ref 3.4–5)
ALBUMIN/GLOB SERPL: 0.8 {RATIO} (ref 1.1–1.5)
ALP SERPL-CCNC: 94 IU/L (ref 46–116)
ALT SERPL W P-5'-P-CCNC: 21 U/L (ref 12–78)
ANION GAP SERPL CALCULATED.3IONS-SCNC: 8 MMOL/L (ref 8–16)
AST SERPL W P-5'-P-CCNC: 24 U/L (ref 10–37)
BASOPHILS # BLD AUTO: 0 X10'3 (ref 0–0.2)
BASOPHILS NFR BLD AUTO: 0.1 % (ref 0–1)
BILIRUB SERPL-MCNC: 0.8 MG/DL (ref 0.1–1)
BUN SERPL-MCNC: 77 MG/DL (ref 7–18)
BUN/CREAT SERPL: 42.8 (ref 5.4–32)
CALCIUM SERPL-MCNC: 8.8 MG/DL (ref 8.5–10.1)
CHLORIDE SERPL-SCNC: 111 MMOL/L (ref 99–107)
CO2 SERPL-SCNC: 21 MMOL/L (ref 24–32)
CREAT SERPL-MCNC: 1.8 MG/DL (ref 0.6–1.1)
EOSINOPHIL # BLD AUTO: 1 X10'3 (ref 0–0.9)
EOSINOPHIL NFR BLD AUTO: 19.9 % (ref 0–6)
ERYTHROCYTE [DISTWIDTH] IN BLOOD BY AUTOMATED COUNT: 16.7 % (ref 11.5–14.5)
GFR SERPL CREATININE-BSD FRML MDRD: 36 ML/MIN
GLUCOSE SERPL-MCNC: 89 MG/DL (ref 70–104)
HCT VFR BLD AUTO: 27.7 % (ref 42–52)
HGB BLD-MCNC: 9.1 G/DL (ref 14–17.9)
INR PPP: 2.3 INR
LYMPHOCYTES # BLD AUTO: 0.2 X10'3 (ref 1.1–4.8)
LYMPHOCYTES NFR BLD AUTO: 3.2 % (ref 21–51)
MAGNESIUM SERPL-MCNC: 1.7 MG/DL (ref 1.5–2.4)
MCH RBC QN AUTO: 29.7 PG (ref 27–31)
MCHC RBC AUTO-ENTMCNC: 33 % (ref 33–36.5)
MCV RBC AUTO: 90.1 FL (ref 78–98)
MONOCYTES # BLD AUTO: 0.5 X10'3 (ref 0–0.9)
MONOCYTES NFR BLD AUTO: 8.8 % (ref 2–12)
NEUTROPHILS # BLD AUTO: 3.5 X10'3 (ref 1.8–7.7)
NEUTROPHILS NFR BLD AUTO: 68 % (ref 42–75)
PHOSPHATE SERPL-MCNC: 2.7 MG/DL (ref 2.3–4.5)
PLATELET # BLD AUTO: 68 X10'3 (ref 140–440)
PMV BLD AUTO: 7.8 FL (ref 7.4–10.4)
POTASSIUM SERPL-SCNC: 5 MMOL/L (ref 3.5–5.1)
PROT SERPL-MCNC: 6.4 G/DL (ref 6.4–8.2)
PROTHROMBIN TIME: 23.4 SECONDS (ref 9–12)
RBC # BLD AUTO: 3.08 X10'6 (ref 4.7–6.1)
SODIUM SERPL-SCNC: 140 MMOL/L (ref 135–145)
WBC # BLD AUTO: 5.2 X10'3 (ref 4.5–11)

## 2018-08-16 RX ADMIN — BRIMONIDINE TARTRATE SCH DROP: 2 SOLUTION/ DROPS OPHTHALMIC at 08:00

## 2018-08-16 RX ADMIN — PANTOPRAZOLE SODIUM SCH MG: 40 TABLET, DELAYED RELEASE ORAL at 10:06

## 2018-08-16 RX ADMIN — BRIMONIDINE TARTRATE SCH DROP: 2 SOLUTION/ DROPS OPHTHALMIC at 12:00

## 2018-08-17 VITALS — DIASTOLIC BLOOD PRESSURE: 57 MMHG | SYSTOLIC BLOOD PRESSURE: 119 MMHG

## 2018-08-17 VITALS — SYSTOLIC BLOOD PRESSURE: 113 MMHG | DIASTOLIC BLOOD PRESSURE: 54 MMHG

## 2018-08-17 VITALS — DIASTOLIC BLOOD PRESSURE: 85 MMHG | SYSTOLIC BLOOD PRESSURE: 134 MMHG

## 2018-08-17 VITALS — SYSTOLIC BLOOD PRESSURE: 141 MMHG | DIASTOLIC BLOOD PRESSURE: 72 MMHG

## 2018-08-17 LAB
ALBUMIN SERPL BCP-MCNC: 2.6 G/DL (ref 3.4–5)
ALBUMIN/GLOB SERPL: 0.7 {RATIO} (ref 1.1–1.5)
ALP SERPL-CCNC: 89 IU/L (ref 46–116)
ALT SERPL W P-5'-P-CCNC: 21 U/L (ref 12–78)
ANION GAP SERPL CALCULATED.3IONS-SCNC: 6 MMOL/L (ref 8–16)
ANISOCYTOSIS BLD QL SMEAR: (no result)
AST SERPL W P-5'-P-CCNC: 22 U/L (ref 10–37)
BASOPHILS # BLD AUTO: 0 X10'3 (ref 0–0.2)
BASOPHILS NFR BLD AUTO: 0.1 % (ref 0–1)
BILIRUB SERPL-MCNC: 0.6 MG/DL (ref 0.1–1)
BUN SERPL-MCNC: 64 MG/DL (ref 7–18)
BUN/CREAT SERPL: 32.8 (ref 5.4–32)
CALCIUM SERPL-MCNC: 8.6 MG/DL (ref 8.5–10.1)
CHLORIDE SERPL-SCNC: 110 MMOL/L (ref 99–107)
CO2 SERPL-SCNC: 21.6 MMOL/L (ref 24–32)
CREAT SERPL-MCNC: 1.95 MG/DL (ref 0.6–1.1)
DACRYOCYTES BLD QL SMEAR: (no result)
EOSINOPHIL # BLD AUTO: 0.8 X10'3 (ref 0–0.9)
EOSINOPHIL NFR BLD AUTO: 19.3 % (ref 0–6)
ERYTHROCYTE [DISTWIDTH] IN BLOOD BY AUTOMATED COUNT: 17.2 % (ref 11.5–14.5)
GFR SERPL CREATININE-BSD FRML MDRD: 33 ML/MIN
GLUCOSE SERPL-MCNC: 88 MG/DL (ref 70–104)
HCT VFR BLD AUTO: 25.6 % (ref 42–52)
HGB BLD-MCNC: 8.7 G/DL (ref 14–17.9)
HYPOCHROMIA BLD QL SMEAR: (no result)
INR PPP: 2.5 INR
LYMPHOCYTES # BLD AUTO: 0.2 X10'3 (ref 1.1–4.8)
LYMPHOCYTES NFR BLD AUTO: 4.6 % (ref 21–51)
MAGNESIUM SERPL-MCNC: 1.6 MG/DL (ref 1.5–2.4)
MCH RBC QN AUTO: 29.8 PG (ref 27–31)
MCHC RBC AUTO-ENTMCNC: 33.9 % (ref 33–36.5)
MCV RBC AUTO: 88 FL (ref 78–98)
MONOCYTES # BLD AUTO: 0.5 X10'3 (ref 0–0.9)
MONOCYTES NFR BLD AUTO: 12.1 % (ref 2–12)
NEUTROPHILS # BLD AUTO: 2.5 X10'3 (ref 1.8–7.7)
NEUTROPHILS NFR BLD AUTO: 63.9 % (ref 42–75)
OVALOCYTES BLD QL SMEAR: (no result)
PLATELET # BLD AUTO: 75 X10'3 (ref 140–440)
PLATELET BLD QL SMEAR: (no result)
PMV BLD AUTO: 7.3 FL (ref 7.4–10.4)
POLYCHROMASIA BLD QL SMEAR: (no result)
POTASSIUM SERPL-SCNC: 4.9 MMOL/L (ref 3.5–5.1)
PROT SERPL-MCNC: 6.2 G/DL (ref 6.4–8.2)
PROTHROMBIN TIME: 25 SECONDS (ref 9–12)
RBC # BLD AUTO: 2.91 X10'6 (ref 4.7–6.1)
RBC MORPH BLD: (no result)
SODIUM SERPL-SCNC: 138 MMOL/L (ref 135–145)
WBC # BLD AUTO: 3.9 X10'3 (ref 4.5–11)

## 2018-08-17 RX ADMIN — PANTOPRAZOLE SODIUM SCH MG: 40 TABLET, DELAYED RELEASE ORAL at 08:17

## 2018-08-17 RX ADMIN — BRIMONIDINE TARTRATE SCH DROP: 2 SOLUTION/ DROPS OPHTHALMIC at 12:59

## 2018-08-17 RX ADMIN — BRIMONIDINE TARTRATE SCH DROP: 2 SOLUTION/ DROPS OPHTHALMIC at 08:17

## 2018-11-03 ENCOUNTER — HOSPITAL ENCOUNTER (INPATIENT)
Dept: HOSPITAL 94 - ER | Age: 83
LOS: 6 days | Discharge: NURSING, REHAB OR LONG-TERM CARE | DRG: 564 | End: 2018-11-09
Attending: INTERNAL MEDICINE | Admitting: SPECIALIST
Payer: MEDICARE

## 2018-11-03 VITALS — WEIGHT: 200.42 LBS | BODY MASS INDEX: 27.15 KG/M2 | HEIGHT: 72 IN
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
## 2018-11-03 DIAGNOSIS — M54.5: ICD-10-CM
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
## 2018-11-03 DIAGNOSIS — L81.4: ICD-10-CM

## 2018-11-03 DIAGNOSIS — M10.9: ICD-10-CM

## 2018-11-03 DIAGNOSIS — D46.9: ICD-10-CM

## 2018-11-03 DIAGNOSIS — J44.0: ICD-10-CM

## 2018-11-03 DIAGNOSIS — T79.6XXA: Primary | ICD-10-CM

## 2018-11-03 DIAGNOSIS — Z79.899: ICD-10-CM

## 2018-11-03 DIAGNOSIS — R29.6: ICD-10-CM

## 2018-11-03 DIAGNOSIS — I27.20: ICD-10-CM

## 2018-11-03 DIAGNOSIS — Z95.0: ICD-10-CM

## 2018-11-03 DIAGNOSIS — I50.9: ICD-10-CM
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
## 2018-11-03 DIAGNOSIS — E86.0: ICD-10-CM
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
## 2018-11-03 DIAGNOSIS — I87.2: ICD-10-CM

## 2018-11-03 DIAGNOSIS — J18.1: ICD-10-CM

## 2018-11-03 DIAGNOSIS — J44.1: ICD-10-CM

## 2018-11-03 DIAGNOSIS — N18.9: ICD-10-CM

## 2018-11-03 DIAGNOSIS — Z66: ICD-10-CM

## 2018-11-03 DIAGNOSIS — I13.0: ICD-10-CM

## 2018-11-03 DIAGNOSIS — F32.9: ICD-10-CM

## 2018-11-03 DIAGNOSIS — N40.0: ICD-10-CM

## 2018-11-03 DIAGNOSIS — L85.9: ICD-10-CM

## 2018-11-03 DIAGNOSIS — Z86.73: ICD-10-CM

## 2018-11-03 DIAGNOSIS — I48.2: ICD-10-CM

## 2018-11-03 DIAGNOSIS — K21.9: ICD-10-CM

## 2018-11-03 DIAGNOSIS — Z87.891: ICD-10-CM

## 2018-11-03 LAB
ALBUMIN SERPL BCP-MCNC: 3 G/DL (ref 3.4–5)
ALBUMIN/GLOB SERPL: 0.6 {RATIO} (ref 1.1–1.5)
ALP SERPL-CCNC: 131 IU/L (ref 46–116)
ALT SERPL W P-5'-P-CCNC: 23 U/L (ref 12–78)
ANION GAP SERPL CALCULATED.3IONS-SCNC: 14 MMOL/L (ref 8–16)
AST SERPL W P-5'-P-CCNC: 71 U/L (ref 10–37)
BASOPHILS # BLD AUTO: 0 X10'3 (ref 0–0.2)
BASOPHILS NFR BLD AUTO: 0 % (ref 0–1)
BILIRUB SERPL-MCNC: 1.9 MG/DL (ref 0.1–1)
BUN SERPL-MCNC: 41 MG/DL (ref 7–18)
BUN/CREAT SERPL: 20 (ref 5.4–32)
CALCIUM SERPL-MCNC: 9.3 MG/DL (ref 8.5–10.1)
CHLORIDE SERPL-SCNC: 106 MMOL/L (ref 99–107)
CO2 SERPL-SCNC: 21.8 MMOL/L (ref 24–32)
CREAT SERPL-MCNC: 2.05 MG/DL (ref 0.6–1.1)
EOSINOPHIL # BLD AUTO: 0.1 X10'3 (ref 0–0.9)
EOSINOPHIL NFR BLD AUTO: 1.7 % (ref 0–6)
ERYTHROCYTE [DISTWIDTH] IN BLOOD BY AUTOMATED COUNT: 15.7 % (ref 11.5–14.5)
GFR SERPL CREATININE-BSD FRML MDRD: 31 ML/MIN
GLUCOSE SERPL-MCNC: 84 MG/DL (ref 70–104)
HCT VFR BLD AUTO: 32.5 % (ref 42–52)
HGB BLD-MCNC: 10.7 G/DL (ref 14–17.9)
LYMPHOCYTES # BLD AUTO: 0.3 X10'3 (ref 1.1–4.8)
LYMPHOCYTES NFR BLD AUTO: 3.5 % (ref 21–51)
MCH RBC QN AUTO: 29.3 PG (ref 27–31)
MCHC RBC AUTO-ENTMCNC: 32.9 % (ref 33–36.5)
MCV RBC AUTO: 88.9 FL (ref 78–98)
MONOCYTES # BLD AUTO: 0.8 X10'3 (ref 0–0.9)
MONOCYTES NFR BLD AUTO: 10.4 % (ref 2–12)
NEUTROPHILS # BLD AUTO: 6.2 X10'3 (ref 1.8–7.7)
NEUTROPHILS NFR BLD AUTO: 84.4 % (ref 42–75)
PLATELET # BLD AUTO: 157 X10'3 (ref 140–440)
PMV BLD AUTO: 6.1 FL (ref 7.4–10.4)
POTASSIUM SERPL-SCNC: 4.3 MMOL/L (ref 3.5–5.1)
PROT SERPL-MCNC: 7.8 G/DL (ref 6.4–8.2)
RBC # BLD AUTO: 3.65 X10'6 (ref 4.7–6.1)
SODIUM SERPL-SCNC: 142 MMOL/L (ref 135–145)
WBC # BLD AUTO: 7.4 X10'3 (ref 4.5–11)

## 2018-11-03 PROCEDURE — 36415 COLL VENOUS BLD VENIPUNCTURE: CPT

## 2018-11-03 PROCEDURE — 84100 ASSAY OF PHOSPHORUS: CPT

## 2018-11-03 PROCEDURE — 83874 ASSAY OF MYOGLOBIN: CPT

## 2018-11-03 PROCEDURE — 83735 ASSAY OF MAGNESIUM: CPT

## 2018-11-03 PROCEDURE — 94760 N-INVAS EAR/PLS OXIMETRY 1: CPT

## 2018-11-03 PROCEDURE — 97110 THERAPEUTIC EXERCISES: CPT

## 2018-11-03 PROCEDURE — 83605 ASSAY OF LACTIC ACID: CPT

## 2018-11-03 PROCEDURE — 71045 X-RAY EXAM CHEST 1 VIEW: CPT

## 2018-11-03 PROCEDURE — 82550 ASSAY OF CK (CPK): CPT

## 2018-11-03 PROCEDURE — 85025 COMPLETE CBC W/AUTO DIFF WBC: CPT

## 2018-11-03 PROCEDURE — 97116 GAIT TRAINING THERAPY: CPT

## 2018-11-03 PROCEDURE — 84145 PROCALCITONIN (PCT): CPT

## 2018-11-03 PROCEDURE — 81001 URINALYSIS AUTO W/SCOPE: CPT

## 2018-11-03 PROCEDURE — 87040 BLOOD CULTURE FOR BACTERIA: CPT

## 2018-11-03 PROCEDURE — 82553 CREATINE MB FRACTION: CPT

## 2018-11-03 PROCEDURE — 94640 AIRWAY INHALATION TREATMENT: CPT

## 2018-11-03 PROCEDURE — 97162 PT EVAL MOD COMPLEX 30 MIN: CPT

## 2018-11-03 PROCEDURE — 97530 THERAPEUTIC ACTIVITIES: CPT

## 2018-11-03 PROCEDURE — 93005 ELECTROCARDIOGRAM TRACING: CPT

## 2018-11-03 PROCEDURE — 96374 THER/PROPH/DIAG INJ IV PUSH: CPT

## 2018-11-03 PROCEDURE — 80053 COMPREHEN METABOLIC PANEL: CPT

## 2018-11-03 PROCEDURE — 99285 EMERGENCY DEPT VISIT HI MDM: CPT

## 2018-11-03 PROCEDURE — 87070 CULTURE OTHR SPECIMN AEROBIC: CPT

## 2018-11-04 VITALS — DIASTOLIC BLOOD PRESSURE: 56 MMHG | SYSTOLIC BLOOD PRESSURE: 126 MMHG

## 2018-11-04 VITALS — DIASTOLIC BLOOD PRESSURE: 50 MMHG | SYSTOLIC BLOOD PRESSURE: 103 MMHG

## 2018-11-04 VITALS — DIASTOLIC BLOOD PRESSURE: 59 MMHG | SYSTOLIC BLOOD PRESSURE: 120 MMHG

## 2018-11-04 VITALS — SYSTOLIC BLOOD PRESSURE: 114 MMHG | DIASTOLIC BLOOD PRESSURE: 60 MMHG

## 2018-11-04 LAB
ALBUMIN SERPL BCP-MCNC: 2.7 G/DL (ref 3.4–5)
ALBUMIN/GLOB SERPL: 0.6 {RATIO} (ref 1.1–1.5)
ALP SERPL-CCNC: 118 IU/L (ref 46–116)
ALT SERPL W P-5'-P-CCNC: 21 U/L (ref 12–78)
ANION GAP SERPL CALCULATED.3IONS-SCNC: 12 MMOL/L (ref 8–16)
AST SERPL W P-5'-P-CCNC: 64 U/L (ref 10–37)
BACTERIA URNS QL MICRO: (no result) /HPF
BILIRUB SERPL-MCNC: 0.9 MG/DL (ref 0.1–1)
BUN SERPL-MCNC: 48 MG/DL (ref 7–18)
BUN/CREAT SERPL: 23.1 (ref 5.4–32)
CALCIUM SERPL-MCNC: 8.8 MG/DL (ref 8.5–10.1)
CHLORIDE SERPL-SCNC: 106 MMOL/L (ref 99–107)
CK MB SERPL-RTO: 2.7 RATIO (ref 0–2.5)
CK SERPL-CCNC: 926 U/L (ref 39–308)
CLARITY UR: CLEAR
CO2 SERPL-SCNC: 23.1 MMOL/L (ref 24–32)
COLOR UR: YELLOW
CREAT SERPL-MCNC: 2.08 MG/DL (ref 0.6–1.1)
DEPRECATED SQUAMOUS URNS QL MICRO: (no result) /LPF
GFR SERPL CREATININE-BSD FRML MDRD: 30 ML/MIN
GLUCOSE SERPL-MCNC: 110 MG/DL (ref 70–104)
GLUCOSE UR STRIP-MCNC: NEGATIVE MG/DL
HGB UR QL STRIP: (no result)
KETONES UR STRIP-MCNC: NEGATIVE MG/DL
LEUKOCYTE ESTERASE UR QL STRIP: NEGATIVE
MAGNESIUM SERPL-MCNC: 1.8 MG/DL (ref 1.5–2.4)
MYOGLOBIN SERPL-MCNC: 1753 NG/ML (ref 16–96)
NITRITE UR QL STRIP: NEGATIVE
PH UR STRIP: 5.5 [PH] (ref 4.8–8)
POTASSIUM SERPL-SCNC: 4.2 MMOL/L (ref 3.5–5.1)
PROT SERPL-MCNC: 7.3 G/DL (ref 6.4–8.2)
PROT UR QL STRIP: (no result) MG/DL
RBC #/AREA URNS HPF: (no result) /HPF (ref 0–2)
SODIUM SERPL-SCNC: 141 MMOL/L (ref 135–145)
SP GR UR STRIP: 1.02 (ref 1–1.03)
URN COLLECT METHOD CLASS: (no result)
UROBILINOGEN UR STRIP-MCNC: 0.2 E.U/DL (ref 0.2–1)
WBC #/AREA URNS HPF: (no result) /HPF (ref 0–4)

## 2018-11-04 RX ADMIN — HYDROCODONE BITARTRATE AND ACETAMINOPHEN PRN TAB: 5; 325 TABLET ORAL at 21:55

## 2018-11-04 RX ADMIN — CEFTRIAXONE SCH MLS/HR: 1 INJECTION, SOLUTION INTRAVENOUS at 23:43

## 2018-11-04 RX ADMIN — SODIUM CHLORIDE SCH MLS/HR: 9 INJECTION INTRAMUSCULAR; INTRAVENOUS; SUBCUTANEOUS at 11:27

## 2018-11-04 RX ADMIN — SODIUM CHLORIDE SCH MLS/HR: 9 INJECTION INTRAMUSCULAR; INTRAVENOUS; SUBCUTANEOUS at 13:21

## 2018-11-04 RX ADMIN — PANTOPRAZOLE SODIUM SCH MG: 40 TABLET, DELAYED RELEASE ORAL at 07:37

## 2018-11-04 RX ADMIN — LATANOPROST SCH DROP: 50 SOLUTION OPHTHALMIC at 22:01

## 2018-11-04 RX ADMIN — DOXEPIN HYDROCHLORIDE SCH MG: 10 CAPSULE ORAL at 20:04

## 2018-11-04 RX ADMIN — SODIUM CHLORIDE SCH MLS/HR: 9 INJECTION INTRAMUSCULAR; INTRAVENOUS; SUBCUTANEOUS at 01:53

## 2018-11-04 RX ADMIN — DOCUSATE SODIUM SCH MG: 100 CAPSULE, LIQUID FILLED ORAL at 07:38

## 2018-11-05 VITALS — DIASTOLIC BLOOD PRESSURE: 71 MMHG | SYSTOLIC BLOOD PRESSURE: 130 MMHG

## 2018-11-05 VITALS — DIASTOLIC BLOOD PRESSURE: 80 MMHG | SYSTOLIC BLOOD PRESSURE: 143 MMHG

## 2018-11-05 VITALS — DIASTOLIC BLOOD PRESSURE: 51 MMHG | SYSTOLIC BLOOD PRESSURE: 116 MMHG

## 2018-11-05 VITALS — DIASTOLIC BLOOD PRESSURE: 63 MMHG | SYSTOLIC BLOOD PRESSURE: 129 MMHG

## 2018-11-05 LAB
ALBUMIN SERPL BCP-MCNC: 2.3 G/DL (ref 3.4–5)
ALBUMIN/GLOB SERPL: 0.6 {RATIO} (ref 1.1–1.5)
ALP SERPL-CCNC: 94 IU/L (ref 46–116)
ALT SERPL W P-5'-P-CCNC: 20 U/L (ref 12–78)
ANION GAP SERPL CALCULATED.3IONS-SCNC: 10 MMOL/L (ref 8–16)
AST SERPL W P-5'-P-CCNC: 46 U/L (ref 10–37)
BASOPHILS # BLD AUTO: 0 X10'3 (ref 0–0.2)
BASOPHILS NFR BLD AUTO: 0.2 % (ref 0–1)
BILIRUB SERPL-MCNC: 0.5 MG/DL (ref 0.1–1)
BUN SERPL-MCNC: 50 MG/DL (ref 7–18)
BUN/CREAT SERPL: 24 (ref 5.4–32)
CALCIUM SERPL-MCNC: 8.6 MG/DL (ref 8.5–10.1)
CHLORIDE SERPL-SCNC: 107 MMOL/L (ref 99–107)
CO2 SERPL-SCNC: 23.2 MMOL/L (ref 24–32)
CREAT SERPL-MCNC: 2.08 MG/DL (ref 0.6–1.1)
EOSINOPHIL # BLD AUTO: 0.4 X10'3 (ref 0–0.9)
EOSINOPHIL NFR BLD AUTO: 9.4 % (ref 0–6)
ERYTHROCYTE [DISTWIDTH] IN BLOOD BY AUTOMATED COUNT: 16 % (ref 11.5–14.5)
GFR SERPL CREATININE-BSD FRML MDRD: 30 ML/MIN
GLUCOSE SERPL-MCNC: 100 MG/DL (ref 70–104)
HCT VFR BLD AUTO: 28.6 % (ref 42–52)
HGB BLD-MCNC: 9.2 G/DL (ref 14–17.9)
LYMPHOCYTES # BLD AUTO: 0.4 X10'3 (ref 1.1–4.8)
LYMPHOCYTES NFR BLD AUTO: 8.4 % (ref 21–51)
MCH RBC QN AUTO: 28.9 PG (ref 27–31)
MCHC RBC AUTO-ENTMCNC: 32.2 % (ref 33–36.5)
MCV RBC AUTO: 89.6 FL (ref 78–98)
MONOCYTES # BLD AUTO: 0.5 X10'3 (ref 0–0.9)
MONOCYTES NFR BLD AUTO: 11.2 % (ref 2–12)
NEUTROPHILS # BLD AUTO: 3.2 X10'3 (ref 1.8–7.7)
NEUTROPHILS NFR BLD AUTO: 70.8 % (ref 42–75)
PLATELET # BLD AUTO: 118 X10'3 (ref 140–440)
PMV BLD AUTO: 7 FL (ref 7.4–10.4)
POTASSIUM SERPL-SCNC: 3.9 MMOL/L (ref 3.5–5.1)
PROT SERPL-MCNC: 6.3 G/DL (ref 6.4–8.2)
RBC # BLD AUTO: 3.19 X10'6 (ref 4.7–6.1)
SODIUM SERPL-SCNC: 140 MMOL/L (ref 135–145)
WBC # BLD AUTO: 4.5 X10'3 (ref 4.5–11)

## 2018-11-05 RX ADMIN — Medication SCH MMU: at 20:19

## 2018-11-05 RX ADMIN — LATANOPROST SCH DROP: 50 SOLUTION OPHTHALMIC at 21:46

## 2018-11-05 RX ADMIN — HYDROCODONE BITARTRATE AND ACETAMINOPHEN PRN TAB: 5; 325 TABLET ORAL at 11:21

## 2018-11-05 RX ADMIN — DOXEPIN HYDROCHLORIDE SCH MG: 10 CAPSULE ORAL at 20:19

## 2018-11-05 RX ADMIN — SODIUM CHLORIDE SCH MLS/HR: 9 INJECTION INTRAMUSCULAR; INTRAVENOUS; SUBCUTANEOUS at 15:14

## 2018-11-05 RX ADMIN — DOCUSATE SODIUM SCH MG: 100 CAPSULE, LIQUID FILLED ORAL at 07:47

## 2018-11-05 RX ADMIN — PANTOPRAZOLE SODIUM SCH MG: 40 TABLET, DELAYED RELEASE ORAL at 07:47

## 2018-11-05 RX ADMIN — SODIUM CHLORIDE SCH MLS/HR: 9 INJECTION INTRAMUSCULAR; INTRAVENOUS; SUBCUTANEOUS at 03:31

## 2018-11-06 VITALS — SYSTOLIC BLOOD PRESSURE: 142 MMHG | DIASTOLIC BLOOD PRESSURE: 77 MMHG

## 2018-11-06 VITALS — SYSTOLIC BLOOD PRESSURE: 118 MMHG | DIASTOLIC BLOOD PRESSURE: 63 MMHG

## 2018-11-06 VITALS — SYSTOLIC BLOOD PRESSURE: 144 MMHG | DIASTOLIC BLOOD PRESSURE: 78 MMHG

## 2018-11-06 VITALS — SYSTOLIC BLOOD PRESSURE: 108 MMHG | DIASTOLIC BLOOD PRESSURE: 54 MMHG

## 2018-11-06 VITALS — SYSTOLIC BLOOD PRESSURE: 123 MMHG | DIASTOLIC BLOOD PRESSURE: 49 MMHG

## 2018-11-06 LAB
ALBUMIN SERPL BCP-MCNC: 2.3 G/DL (ref 3.4–5)
ALBUMIN/GLOB SERPL: 0.6 {RATIO} (ref 1.1–1.5)
ALP SERPL-CCNC: 86 IU/L (ref 46–116)
ALT SERPL W P-5'-P-CCNC: 18 U/L (ref 12–78)
ANION GAP SERPL CALCULATED.3IONS-SCNC: 9 MMOL/L (ref 8–16)
AST SERPL W P-5'-P-CCNC: 36 U/L (ref 10–37)
BASOPHILS # BLD AUTO: 0 X10'3 (ref 0–0.2)
BASOPHILS NFR BLD AUTO: 0.3 % (ref 0–1)
BILIRUB SERPL-MCNC: 0.4 MG/DL (ref 0.1–1)
BUN SERPL-MCNC: 42 MG/DL (ref 7–18)
BUN/CREAT SERPL: 22.8 (ref 5.4–32)
CALCIUM SERPL-MCNC: 8.3 MG/DL (ref 8.5–10.1)
CHLORIDE SERPL-SCNC: 109 MMOL/L (ref 99–107)
CO2 SERPL-SCNC: 23.2 MMOL/L (ref 24–32)
CREAT SERPL-MCNC: 1.84 MG/DL (ref 0.6–1.1)
EOSINOPHIL # BLD AUTO: 0.3 X10'3 (ref 0–0.9)
EOSINOPHIL NFR BLD AUTO: 9.9 % (ref 0–6)
ERYTHROCYTE [DISTWIDTH] IN BLOOD BY AUTOMATED COUNT: 15.8 % (ref 11.5–14.5)
GFR SERPL CREATININE-BSD FRML MDRD: 35 ML/MIN
GLUCOSE SERPL-MCNC: 87 MG/DL (ref 70–104)
HCT VFR BLD AUTO: 27.6 % (ref 42–52)
HGB BLD-MCNC: 9.2 G/DL (ref 14–17.9)
LYMPHOCYTES # BLD AUTO: 0.3 X10'3 (ref 1.1–4.8)
LYMPHOCYTES NFR BLD AUTO: 9.2 % (ref 21–51)
MCH RBC QN AUTO: 29.6 PG (ref 27–31)
MCHC RBC AUTO-ENTMCNC: 33.2 % (ref 33–36.5)
MCV RBC AUTO: 89.3 FL (ref 78–98)
MONOCYTES # BLD AUTO: 0.4 X10'3 (ref 0–0.9)
MONOCYTES NFR BLD AUTO: 11.9 % (ref 2–12)
NEUTROPHILS # BLD AUTO: 2.2 X10'3 (ref 1.8–7.7)
NEUTROPHILS NFR BLD AUTO: 68.7 % (ref 42–75)
PLATELET # BLD AUTO: 101 X10'3 (ref 140–440)
PMV BLD AUTO: 7 FL (ref 7.4–10.4)
POTASSIUM SERPL-SCNC: 3.7 MMOL/L (ref 3.5–5.1)
PROT SERPL-MCNC: 6.3 G/DL (ref 6.4–8.2)
RBC # BLD AUTO: 3.09 X10'6 (ref 4.7–6.1)
SODIUM SERPL-SCNC: 141 MMOL/L (ref 135–145)
WBC # BLD AUTO: 3.2 X10'3 (ref 4.5–11)

## 2018-11-06 RX ADMIN — DOCUSATE SODIUM SCH MG: 100 CAPSULE, LIQUID FILLED ORAL at 07:46

## 2018-11-06 RX ADMIN — LATANOPROST SCH DROP: 50 SOLUTION OPHTHALMIC at 20:24

## 2018-11-06 RX ADMIN — DOXEPIN HYDROCHLORIDE SCH MG: 10 CAPSULE ORAL at 20:23

## 2018-11-06 RX ADMIN — SODIUM CHLORIDE SCH MLS/HR: 9 INJECTION INTRAMUSCULAR; INTRAVENOUS; SUBCUTANEOUS at 01:38

## 2018-11-06 RX ADMIN — HYDROCODONE BITARTRATE AND ACETAMINOPHEN PRN TAB: 5; 325 TABLET ORAL at 23:12

## 2018-11-06 RX ADMIN — Medication SCH MMU: at 07:46

## 2018-11-06 RX ADMIN — PANTOPRAZOLE SODIUM SCH MG: 40 TABLET, DELAYED RELEASE ORAL at 07:46

## 2018-11-06 RX ADMIN — CEFTRIAXONE SCH MLS/HR: 1 INJECTION, SOLUTION INTRAVENOUS at 00:25

## 2018-11-06 RX ADMIN — CEFTRIAXONE SCH MLS/HR: 1 INJECTION, SOLUTION INTRAVENOUS at 12:48

## 2018-11-06 RX ADMIN — SODIUM CHLORIDE SCH MLS/HR: 9 INJECTION INTRAMUSCULAR; INTRAVENOUS; SUBCUTANEOUS at 13:47

## 2018-11-06 RX ADMIN — SODIUM CHLORIDE SCH MLS/HR: 9 INJECTION INTRAMUSCULAR; INTRAVENOUS; SUBCUTANEOUS at 23:27

## 2018-11-06 RX ADMIN — HEPARIN SODIUM SCH UNIT: 5000 INJECTION, SOLUTION INTRAVENOUS; SUBCUTANEOUS at 20:25

## 2018-11-06 RX ADMIN — Medication SCH MMU: at 20:24

## 2018-11-07 VITALS — SYSTOLIC BLOOD PRESSURE: 114 MMHG | DIASTOLIC BLOOD PRESSURE: 59 MMHG

## 2018-11-07 VITALS — SYSTOLIC BLOOD PRESSURE: 130 MMHG | DIASTOLIC BLOOD PRESSURE: 74 MMHG

## 2018-11-07 VITALS — SYSTOLIC BLOOD PRESSURE: 112 MMHG | DIASTOLIC BLOOD PRESSURE: 57 MMHG

## 2018-11-07 VITALS — SYSTOLIC BLOOD PRESSURE: 129 MMHG | DIASTOLIC BLOOD PRESSURE: 72 MMHG

## 2018-11-07 LAB
ALBUMIN SERPL BCP-MCNC: 2.3 G/DL (ref 3.4–5)
ALBUMIN/GLOB SERPL: 0.6 {RATIO} (ref 1.1–1.5)
ALP SERPL-CCNC: 94 IU/L (ref 46–116)
ALT SERPL W P-5'-P-CCNC: 17 U/L (ref 12–78)
ANION GAP SERPL CALCULATED.3IONS-SCNC: 10 MMOL/L (ref 8–16)
ANISOCYTOSIS BLD QL SMEAR: (no result)
AST SERPL W P-5'-P-CCNC: 31 U/L (ref 10–37)
BASOPHILS # BLD AUTO: 0 X10'3 (ref 0–0.2)
BASOPHILS NFR BLD AUTO: 0.3 % (ref 0–1)
BASOPHILS NFR BLD MANUAL: 2 % (ref 0–1)
BILIRUB SERPL-MCNC: 0.4 MG/DL (ref 0.1–1)
BUN SERPL-MCNC: 34 MG/DL (ref 7–18)
BUN/CREAT SERPL: 21.8 (ref 5.4–32)
CALCIUM SERPL-MCNC: 8.4 MG/DL (ref 8.5–10.1)
CHLORIDE SERPL-SCNC: 109 MMOL/L (ref 99–107)
CK SERPL-CCNC: 54 U/L (ref 39–308)
CO2 SERPL-SCNC: 22.3 MMOL/L (ref 24–32)
CREAT SERPL-MCNC: 1.56 MG/DL (ref 0.6–1.1)
EOSINOPHIL # BLD AUTO: 0.3 X10'3 (ref 0–0.9)
EOSINOPHIL NFR BLD AUTO: 10.8 % (ref 0–6)
EOSINOPHIL NFR BLD MANUAL: 9 % (ref 0–6)
ERYTHROCYTE [DISTWIDTH] IN BLOOD BY AUTOMATED COUNT: 15.7 % (ref 11.5–14.5)
GFR SERPL CREATININE-BSD FRML MDRD: 42 ML/MIN
GLUCOSE SERPL-MCNC: 91 MG/DL (ref 70–104)
HCT VFR BLD AUTO: 27.5 % (ref 42–52)
HGB BLD-MCNC: 9.2 G/DL (ref 14–17.9)
LYMPHOCYTES # BLD AUTO: 0.3 X10'3 (ref 1.1–4.8)
LYMPHOCYTES NFR BLD AUTO: 11.2 % (ref 21–51)
LYMPHOCYTES NFR BLD MANUAL: 21 % (ref 21–51)
MAGNESIUM SERPL-MCNC: 1.7 MG/DL (ref 1.5–2.4)
MCH RBC QN AUTO: 29.7 PG (ref 27–31)
MCHC RBC AUTO-ENTMCNC: 33.4 % (ref 33–36.5)
MCV RBC AUTO: 88.9 FL (ref 78–98)
METAMYELOCYTES NFR BLD MANUAL: 1 % (ref 0–0)
MONOCYTES # BLD AUTO: 0.3 X10'3 (ref 0–0.9)
MONOCYTES NFR BLD AUTO: 13.9 % (ref 2–12)
MONOCYTES NFR BLD MANUAL: 10 % (ref 2–12)
MYOGLOBIN SERPL-MCNC: 121 NG/ML (ref 16–96)
NEUTROPHILS # BLD AUTO: 1.5 X10'3 (ref 1.8–7.7)
NEUTROPHILS NFR BLD AUTO: 63.8 % (ref 42–75)
NEUTS BAND # BLD MANUAL: 1 % (ref 0–10)
NEUTS SEG NFR BLD MANUAL: 56 % (ref 42–75)
NRBC BLD MANUAL-RTO: 1 /100WBC (ref 0–0)
PHOSPHATE SERPL-MCNC: 2.6 MG/DL (ref 2.3–4.5)
PLATELET # BLD AUTO: 100 X10'3 (ref 140–440)
PLATELET BLD QL SMEAR: (no result)
PMV BLD AUTO: 6.8 FL (ref 7.4–10.4)
POTASSIUM SERPL-SCNC: 3.8 MMOL/L (ref 3.5–5.1)
PROT SERPL-MCNC: 6.4 G/DL (ref 6.4–8.2)
RBC # BLD AUTO: 3.09 X10'6 (ref 4.7–6.1)
RBC MORPH BLD: (no result)
SODIUM SERPL-SCNC: 141 MMOL/L (ref 135–145)
TOTAL CELLS COUNTED FLD: 100
WBC # BLD AUTO: 2.4 X10'3 (ref 4.5–11)

## 2018-11-07 RX ADMIN — HEPARIN SODIUM SCH UNIT: 5000 INJECTION, SOLUTION INTRAVENOUS; SUBCUTANEOUS at 07:44

## 2018-11-07 RX ADMIN — DOCUSATE SODIUM SCH MG: 100 CAPSULE, LIQUID FILLED ORAL at 07:43

## 2018-11-07 RX ADMIN — PANTOPRAZOLE SODIUM SCH MG: 40 TABLET, DELAYED RELEASE ORAL at 07:43

## 2018-11-07 RX ADMIN — Medication SCH MMU: at 20:49

## 2018-11-07 RX ADMIN — DOXEPIN HYDROCHLORIDE SCH MG: 10 CAPSULE ORAL at 20:49

## 2018-11-07 RX ADMIN — SODIUM CHLORIDE SCH MLS/HR: 9 INJECTION INTRAMUSCULAR; INTRAVENOUS; SUBCUTANEOUS at 04:01

## 2018-11-07 RX ADMIN — Medication SCH MMU: at 07:43

## 2018-11-07 RX ADMIN — SODIUM CHLORIDE SCH MLS/HR: 9 INJECTION INTRAMUSCULAR; INTRAVENOUS; SUBCUTANEOUS at 18:51

## 2018-11-07 RX ADMIN — HEPARIN SODIUM SCH UNIT: 5000 INJECTION, SOLUTION INTRAVENOUS; SUBCUTANEOUS at 20:49

## 2018-11-07 RX ADMIN — CEFTRIAXONE SCH MLS/HR: 1 INJECTION, SOLUTION INTRAVENOUS at 07:43

## 2018-11-07 RX ADMIN — LATANOPROST SCH DROP: 50 SOLUTION OPHTHALMIC at 20:49

## 2018-11-08 VITALS — DIASTOLIC BLOOD PRESSURE: 67 MMHG | SYSTOLIC BLOOD PRESSURE: 122 MMHG

## 2018-11-08 VITALS — SYSTOLIC BLOOD PRESSURE: 151 MMHG | DIASTOLIC BLOOD PRESSURE: 71 MMHG

## 2018-11-08 VITALS — DIASTOLIC BLOOD PRESSURE: 79 MMHG | SYSTOLIC BLOOD PRESSURE: 141 MMHG

## 2018-11-08 VITALS — DIASTOLIC BLOOD PRESSURE: 70 MMHG | SYSTOLIC BLOOD PRESSURE: 146 MMHG

## 2018-11-08 VITALS — SYSTOLIC BLOOD PRESSURE: 142 MMHG | DIASTOLIC BLOOD PRESSURE: 66 MMHG

## 2018-11-08 VITALS — SYSTOLIC BLOOD PRESSURE: 141 MMHG | DIASTOLIC BLOOD PRESSURE: 79 MMHG

## 2018-11-08 VITALS — SYSTOLIC BLOOD PRESSURE: 132 MMHG | DIASTOLIC BLOOD PRESSURE: 70 MMHG

## 2018-11-08 VITALS — SYSTOLIC BLOOD PRESSURE: 154 MMHG | DIASTOLIC BLOOD PRESSURE: 65 MMHG

## 2018-11-08 LAB
ALBUMIN SERPL BCP-MCNC: 2.3 G/DL (ref 3.4–5)
ALBUMIN/GLOB SERPL: 0.6 {RATIO} (ref 1.1–1.5)
ALP SERPL-CCNC: 92 IU/L (ref 46–116)
ALT SERPL W P-5'-P-CCNC: 18 U/L (ref 12–78)
ANION GAP SERPL CALCULATED.3IONS-SCNC: 9 MMOL/L (ref 8–16)
ANISOCYTOSIS BLD QL SMEAR: (no result)
AST SERPL W P-5'-P-CCNC: 26 U/L (ref 10–37)
BASOPHILS # BLD AUTO: 0 X10'3 (ref 0–0.2)
BASOPHILS NFR BLD AUTO: 0.3 % (ref 0–1)
BILIRUB SERPL-MCNC: 0.4 MG/DL (ref 0.1–1)
BUN SERPL-MCNC: 32 MG/DL (ref 7–18)
BUN/CREAT SERPL: 24.1 (ref 5.4–32)
CALCIUM SERPL-MCNC: 8.8 MG/DL (ref 8.5–10.1)
CHLORIDE SERPL-SCNC: 110 MMOL/L (ref 99–107)
CO2 SERPL-SCNC: 22.8 MMOL/L (ref 24–32)
CREAT SERPL-MCNC: 1.33 MG/DL (ref 0.6–1.1)
EOSINOPHIL # BLD AUTO: 0.3 X10'3 (ref 0–0.9)
EOSINOPHIL NFR BLD AUTO: 12.5 % (ref 0–6)
EOSINOPHIL NFR BLD MANUAL: 9 % (ref 0–6)
ERYTHROCYTE [DISTWIDTH] IN BLOOD BY AUTOMATED COUNT: 15.7 % (ref 11.5–14.5)
GFR SERPL CREATININE-BSD FRML MDRD: 51 ML/MIN
GLUCOSE SERPL-MCNC: 92 MG/DL (ref 70–104)
HCT VFR BLD AUTO: 28.1 % (ref 42–52)
HGB BLD-MCNC: 9.4 G/DL (ref 14–17.9)
LYMPHOCYTES # BLD AUTO: 0.2 X10'3 (ref 1.1–4.8)
LYMPHOCYTES NFR BLD AUTO: 8 % (ref 21–51)
LYMPHOCYTES NFR BLD MANUAL: 5 % (ref 21–51)
MAGNESIUM SERPL-MCNC: 1.8 MG/DL (ref 1.5–2.4)
MCH RBC QN AUTO: 30.1 PG (ref 27–31)
MCHC RBC AUTO-ENTMCNC: 33.3 % (ref 33–36.5)
MCV RBC AUTO: 90.3 FL (ref 78–98)
METAMYELOCYTES NFR BLD MANUAL: 1 % (ref 0–0)
MONOCYTES # BLD AUTO: 0.4 X10'3 (ref 0–0.9)
MONOCYTES NFR BLD AUTO: 13.8 % (ref 2–12)
MONOCYTES NFR BLD MANUAL: 14 % (ref 2–12)
NEUTROPHILS # BLD AUTO: 1.8 X10'3 (ref 1.8–7.7)
NEUTROPHILS NFR BLD AUTO: 65.4 % (ref 42–75)
NEUTS SEG NFR BLD MANUAL: 70 % (ref 42–75)
OVALOCYTES BLD QL SMEAR: (no result)
PHOSPHATE SERPL-MCNC: 2.5 MG/DL (ref 2.3–4.5)
PLATELET # BLD AUTO: 101 X10'3 (ref 140–440)
PLATELET BLD QL SMEAR: (no result)
PMV BLD AUTO: 7.1 FL (ref 7.4–10.4)
POTASSIUM SERPL-SCNC: 3.9 MMOL/L (ref 3.5–5.1)
PROT SERPL-MCNC: 6.4 G/DL (ref 6.4–8.2)
RBC # BLD AUTO: 3.11 X10'6 (ref 4.7–6.1)
RBC MORPH BLD: (no result)
SODIUM SERPL-SCNC: 142 MMOL/L (ref 135–145)
TOTAL CELLS COUNTED FLD: 100
VARIANT LYMPHS NFR BLD MANUAL: 1 % (ref 0–0)
WBC # BLD AUTO: 2.7 X10'3 (ref 4.5–11)

## 2018-11-08 RX ADMIN — Medication SCH MMU: at 08:35

## 2018-11-08 RX ADMIN — PANTOPRAZOLE SODIUM SCH MG: 40 TABLET, DELAYED RELEASE ORAL at 08:36

## 2018-11-08 RX ADMIN — DOCUSATE SODIUM SCH MG: 100 CAPSULE, LIQUID FILLED ORAL at 08:34

## 2018-11-08 RX ADMIN — Medication SCH MMU: at 20:53

## 2018-11-08 RX ADMIN — SODIUM CHLORIDE SCH MLS/HR: 9 INJECTION INTRAMUSCULAR; INTRAVENOUS; SUBCUTANEOUS at 13:40

## 2018-11-08 RX ADMIN — IPRATROPIUM BROMIDE AND ALBUTEROL SULFATE PRN ML: .5; 3 SOLUTION RESPIRATORY (INHALATION) at 01:41

## 2018-11-08 RX ADMIN — CEFTRIAXONE SCH MLS/HR: 1 INJECTION, SOLUTION INTRAVENOUS at 08:46

## 2018-11-08 RX ADMIN — LATANOPROST SCH DROP: 50 SOLUTION OPHTHALMIC at 20:54

## 2018-11-08 RX ADMIN — HEPARIN SODIUM SCH UNIT: 5000 INJECTION, SOLUTION INTRAVENOUS; SUBCUTANEOUS at 08:45

## 2018-11-08 RX ADMIN — HEPARIN SODIUM SCH UNIT: 5000 INJECTION, SOLUTION INTRAVENOUS; SUBCUTANEOUS at 20:53

## 2018-11-08 RX ADMIN — DOXEPIN HYDROCHLORIDE SCH MG: 10 CAPSULE ORAL at 20:53

## 2018-11-09 VITALS — DIASTOLIC BLOOD PRESSURE: 76 MMHG | SYSTOLIC BLOOD PRESSURE: 136 MMHG

## 2018-11-09 VITALS — DIASTOLIC BLOOD PRESSURE: 73 MMHG | SYSTOLIC BLOOD PRESSURE: 131 MMHG

## 2018-11-09 VITALS — SYSTOLIC BLOOD PRESSURE: 124 MMHG | DIASTOLIC BLOOD PRESSURE: 75 MMHG

## 2018-11-09 LAB
ALBUMIN SERPL BCP-MCNC: 2.4 G/DL (ref 3.4–5)
ALBUMIN/GLOB SERPL: 0.6 {RATIO} (ref 1.1–1.5)
ALP SERPL-CCNC: 98 IU/L (ref 46–116)
ALT SERPL W P-5'-P-CCNC: 16 U/L (ref 12–78)
ANION GAP SERPL CALCULATED.3IONS-SCNC: 10 MMOL/L (ref 8–16)
ANISOCYTOSIS BLD QL SMEAR: (no result)
AST SERPL W P-5'-P-CCNC: 23 U/L (ref 10–37)
BASOPHILS # BLD AUTO: 0 X10'3 (ref 0–0.2)
BASOPHILS NFR BLD AUTO: 0.2 % (ref 0–1)
BASOPHILS NFR BLD MANUAL: 3 % (ref 0–1)
BILIRUB SERPL-MCNC: 0.4 MG/DL (ref 0.1–1)
BUN SERPL-MCNC: 27 MG/DL (ref 7–18)
BUN/CREAT SERPL: 20 (ref 5.4–32)
CALCIUM SERPL-MCNC: 8.5 MG/DL (ref 8.5–10.1)
CHLORIDE SERPL-SCNC: 110 MMOL/L (ref 99–107)
CO2 SERPL-SCNC: 24.1 MMOL/L (ref 24–32)
CREAT SERPL-MCNC: 1.35 MG/DL (ref 0.6–1.1)
EOSINOPHIL # BLD AUTO: 0.5 X10'3 (ref 0–0.9)
EOSINOPHIL NFR BLD AUTO: 16.5 % (ref 0–6)
EOSINOPHIL NFR BLD MANUAL: 10 % (ref 0–6)
ERYTHROCYTE [DISTWIDTH] IN BLOOD BY AUTOMATED COUNT: 16.3 % (ref 11.5–14.5)
GFR SERPL CREATININE-BSD FRML MDRD: 50 ML/MIN
GLUCOSE SERPL-MCNC: 82 MG/DL (ref 70–104)
HCT VFR BLD AUTO: 28.8 % (ref 42–52)
HGB BLD-MCNC: 9.4 G/DL (ref 14–17.9)
LYMPHOCYTES # BLD AUTO: 0.2 X10'3 (ref 1.1–4.8)
LYMPHOCYTES NFR BLD AUTO: 8.5 % (ref 21–51)
LYMPHOCYTES NFR BLD MANUAL: 8 % (ref 21–51)
MAGNESIUM SERPL-MCNC: 1.6 MG/DL (ref 1.5–2.4)
MCH RBC QN AUTO: 29.4 PG (ref 27–31)
MCHC RBC AUTO-ENTMCNC: 32.7 % (ref 33–36.5)
MCV RBC AUTO: 89.8 FL (ref 78–98)
MONOCYTES # BLD AUTO: 0.4 X10'3 (ref 0–0.9)
MONOCYTES NFR BLD AUTO: 13.3 % (ref 2–12)
MONOCYTES NFR BLD MANUAL: 10 % (ref 2–12)
NEUTROPHILS # BLD AUTO: 1.8 X10'3 (ref 1.8–7.7)
NEUTROPHILS NFR BLD AUTO: 61.5 % (ref 42–75)
NEUTS BAND # BLD MANUAL: 1 % (ref 0–10)
NEUTS SEG NFR BLD MANUAL: 68 % (ref 42–75)
PHOSPHATE SERPL-MCNC: 2.8 MG/DL (ref 2.3–4.5)
PLATELET # BLD AUTO: 118 X10'3 (ref 140–440)
PLATELET BLD QL SMEAR: (no result)
PMV BLD AUTO: 7.2 FL (ref 7.4–10.4)
POTASSIUM SERPL-SCNC: 4 MMOL/L (ref 3.5–5.1)
PROT SERPL-MCNC: 6.6 G/DL (ref 6.4–8.2)
RBC # BLD AUTO: 3.21 X10'6 (ref 4.7–6.1)
RBC MORPH BLD: (no result)
SODIUM SERPL-SCNC: 144 MMOL/L (ref 135–145)
TOTAL CELLS COUNTED FLD: 100
WBC # BLD AUTO: 3 X10'3 (ref 4.5–11)

## 2018-11-09 RX ADMIN — CEFTRIAXONE SCH MLS/HR: 1 INJECTION, SOLUTION INTRAVENOUS at 08:36

## 2018-11-09 RX ADMIN — Medication SCH MMU: at 08:30

## 2018-11-09 RX ADMIN — HEPARIN SODIUM SCH UNIT: 5000 INJECTION, SOLUTION INTRAVENOUS; SUBCUTANEOUS at 08:37

## 2018-11-09 RX ADMIN — SODIUM CHLORIDE SCH MLS/HR: 9 INJECTION INTRAMUSCULAR; INTRAVENOUS; SUBCUTANEOUS at 08:37

## 2018-11-09 RX ADMIN — HYDROCODONE BITARTRATE AND ACETAMINOPHEN PRN TAB: 5; 325 TABLET ORAL at 15:08

## 2018-11-09 RX ADMIN — PANTOPRAZOLE SODIUM SCH MG: 40 TABLET, DELAYED RELEASE ORAL at 08:29

## 2018-11-09 RX ADMIN — IPRATROPIUM BROMIDE AND ALBUTEROL SULFATE PRN ML: .5; 3 SOLUTION RESPIRATORY (INHALATION) at 08:45

## 2018-11-09 RX ADMIN — DOCUSATE SODIUM SCH MG: 100 CAPSULE, LIQUID FILLED ORAL at 08:29

## 2019-03-15 ENCOUNTER — HOSPITAL ENCOUNTER (INPATIENT)
Dept: HOSPITAL 94 - ER | Age: 84
LOS: 2 days | Discharge: HOME | End: 2019-03-17
Payer: MEDICARE

## 2019-03-29 ENCOUNTER — HOSPITAL ENCOUNTER (OUTPATIENT)
Dept: HOSPITAL 94 - PRE-OP | Age: 84
Discharge: HOME | End: 2019-03-29
Attending: SURGERY
Payer: MEDICARE

## 2019-03-29 DIAGNOSIS — K40.90: Primary | ICD-10-CM

## 2019-08-08 ENCOUNTER — HOSPITAL ENCOUNTER (EMERGENCY)
Dept: HOSPITAL 94 - ER | Age: 84
Discharge: HOME | End: 2019-08-08
Payer: MEDICARE

## 2019-08-08 VITALS — BODY MASS INDEX: 30.46 KG/M2 | HEIGHT: 72 IN | WEIGHT: 224.87 LBS

## 2019-08-08 VITALS — DIASTOLIC BLOOD PRESSURE: 92 MMHG | SYSTOLIC BLOOD PRESSURE: 156 MMHG

## 2019-08-08 DIAGNOSIS — J44.9: ICD-10-CM

## 2019-08-08 DIAGNOSIS — Z79.899: ICD-10-CM

## 2019-08-08 DIAGNOSIS — N18.9: ICD-10-CM

## 2019-08-08 DIAGNOSIS — I50.9: ICD-10-CM

## 2019-08-08 DIAGNOSIS — I13.10: Primary | ICD-10-CM

## 2019-08-08 DIAGNOSIS — R06.03: ICD-10-CM

## 2019-08-08 DIAGNOSIS — Z86.73: ICD-10-CM

## 2019-08-08 DIAGNOSIS — Z98.890: ICD-10-CM

## 2019-08-08 LAB
ALBUMIN SERPL BCP-MCNC: 3.1 G/DL (ref 3.4–5)
ALBUMIN/GLOB SERPL: 0.7 {RATIO} (ref 1.1–1.5)
ALP SERPL-CCNC: 154 IU/L (ref 46–116)
ALT SERPL W P-5'-P-CCNC: 28 U/L (ref 12–78)
ANION GAP SERPL CALCULATED.3IONS-SCNC: 8 MMOL/L (ref 8–16)
AST SERPL W P-5'-P-CCNC: 29 U/L (ref 10–37)
BASOPHILS # BLD AUTO: 0 X10'3 (ref 0–0.2)
BASOPHILS NFR BLD AUTO: 0.2 % (ref 0–1)
BILIRUB SERPL-MCNC: 0.5 MG/DL (ref 0.1–1)
BUN SERPL-MCNC: 49 MG/DL (ref 7–18)
BUN/CREAT SERPL: 30.2 (ref 5.4–32)
CALCIUM SERPL-MCNC: 8.8 MG/DL (ref 8.5–10.1)
CHLORIDE SERPL-SCNC: 108 MMOL/L (ref 99–107)
CO2 SERPL-SCNC: 27.5 MMOL/L (ref 24–32)
CREAT SERPL-MCNC: 1.62 MG/DL (ref 0.6–1.1)
EOSINOPHIL # BLD AUTO: 0.2 X10'3 (ref 0–0.9)
EOSINOPHIL NFR BLD AUTO: 3.7 % (ref 0–6)
ERYTHROCYTE [DISTWIDTH] IN BLOOD BY AUTOMATED COUNT: 21.3 % (ref 11.5–14.5)
GFR SERPL CREATININE-BSD FRML MDRD: 41 ML/MIN
GLUCOSE SERPL-MCNC: 111 MG/DL (ref 70–104)
HCT VFR BLD AUTO: 26.6 % (ref 42–52)
HGB BLD-MCNC: 8.6 G/DL (ref 14–17.9)
LYMPHOCYTES # BLD AUTO: 0.2 X10'3 (ref 1.1–4.8)
LYMPHOCYTES NFR BLD AUTO: 5.5 % (ref 21–51)
MCH RBC QN AUTO: 28.1 PG (ref 27–31)
MCHC RBC AUTO-ENTMCNC: 32.5 G/DL (ref 33–36.5)
MCV RBC AUTO: 86.7 FL (ref 78–98)
MONOCYTES # BLD AUTO: 0.6 X10'3 (ref 0–0.9)
MONOCYTES NFR BLD AUTO: 13.1 % (ref 2–12)
NEUTROPHILS # BLD AUTO: 3.4 X10'3 (ref 1.8–7.7)
NEUTROPHILS NFR BLD AUTO: 77.5 % (ref 42–75)
PLATELET # BLD AUTO: 145 X10'3 (ref 140–440)
PMV BLD AUTO: 6.7 FL (ref 7.4–10.4)
POTASSIUM SERPL-SCNC: 4.2 MMOL/L (ref 3.5–5.1)
PROT SERPL-MCNC: 7.8 G/DL (ref 6.4–8.2)
RBC # BLD AUTO: 3.07 X10'6 (ref 4.7–6.1)
SODIUM SERPL-SCNC: 143 MMOL/L (ref 135–145)
WBC # BLD AUTO: 4.3 X10'3 (ref 4.5–11)

## 2019-08-08 PROCEDURE — 84484 ASSAY OF TROPONIN QUANT: CPT

## 2019-08-08 PROCEDURE — 83880 ASSAY OF NATRIURETIC PEPTIDE: CPT

## 2019-08-08 PROCEDURE — 80053 COMPREHEN METABOLIC PANEL: CPT

## 2019-08-08 PROCEDURE — 93005 ELECTROCARDIOGRAM TRACING: CPT

## 2019-08-08 PROCEDURE — 85610 PROTHROMBIN TIME: CPT

## 2019-08-08 PROCEDURE — 85025 COMPLETE CBC W/AUTO DIFF WBC: CPT

## 2019-08-08 PROCEDURE — 99284 EMERGENCY DEPT VISIT MOD MDM: CPT

## 2019-08-08 PROCEDURE — 96374 THER/PROPH/DIAG INJ IV PUSH: CPT

## 2019-08-08 PROCEDURE — 36415 COLL VENOUS BLD VENIPUNCTURE: CPT

## 2019-08-08 PROCEDURE — 71045 X-RAY EXAM CHEST 1 VIEW: CPT

## 2019-08-08 NOTE — NUR
CALLED TO GIVE REPORT TO MARCIE ORTEGA AT [facility]. STATED THEY WILL TAKE 
HIM TOMORROW TO SEE DR SCHAEFFER. STATED O2 WAS PRN AND WILL PLACE BACK ON O2 WHEN 
ARRIVE BACK TO FACILITY AS NEEDED. PT SHOWS NO DISTRESS AT THIS TIME.

## 2019-08-19 ENCOUNTER — HOSPITAL ENCOUNTER (INPATIENT)
Dept: HOSPITAL 94 - ER | Age: 84
LOS: 2 days | Discharge: HOME | DRG: 291 | End: 2019-08-21
Attending: GENERAL PRACTICE | Admitting: GENERAL PRACTICE
Payer: MEDICARE

## 2019-08-19 VITALS — SYSTOLIC BLOOD PRESSURE: 147 MMHG | DIASTOLIC BLOOD PRESSURE: 77 MMHG

## 2019-08-19 VITALS — DIASTOLIC BLOOD PRESSURE: 74 MMHG | SYSTOLIC BLOOD PRESSURE: 154 MMHG

## 2019-08-19 VITALS — WEIGHT: 197.98 LBS | BODY MASS INDEX: 26.81 KG/M2 | HEIGHT: 72 IN

## 2019-08-19 DIAGNOSIS — I13.0: Primary | ICD-10-CM

## 2019-08-19 DIAGNOSIS — Z86.73: ICD-10-CM

## 2019-08-19 DIAGNOSIS — Z79.51: ICD-10-CM

## 2019-08-19 DIAGNOSIS — I48.0: ICD-10-CM

## 2019-08-19 DIAGNOSIS — D46.9: ICD-10-CM

## 2019-08-19 DIAGNOSIS — F03.90: ICD-10-CM

## 2019-08-19 DIAGNOSIS — J44.9: ICD-10-CM

## 2019-08-19 DIAGNOSIS — Z87.01: ICD-10-CM

## 2019-08-19 DIAGNOSIS — I50.33: ICD-10-CM

## 2019-08-19 DIAGNOSIS — N18.4: ICD-10-CM

## 2019-08-19 DIAGNOSIS — Z79.01: ICD-10-CM

## 2019-08-19 DIAGNOSIS — Z79.899: ICD-10-CM

## 2019-08-19 DIAGNOSIS — Z95.0: ICD-10-CM

## 2019-08-19 DIAGNOSIS — J91.8: ICD-10-CM

## 2019-08-19 DIAGNOSIS — J96.20: ICD-10-CM

## 2019-08-19 LAB
ALBUMIN SERPL BCP-MCNC: 3.3 G/DL (ref 3.4–5)
ALBUMIN/GLOB SERPL: 0.8 {RATIO} (ref 1.1–1.5)
ALP SERPL-CCNC: 142 IU/L (ref 46–116)
ALT SERPL W P-5'-P-CCNC: 28 U/L (ref 12–78)
ANION GAP SERPL CALCULATED.3IONS-SCNC: 5 MMOL/L (ref 8–16)
ANISOCYTOSIS BLD QL SMEAR: (no result)
AST SERPL W P-5'-P-CCNC: 24 U/L (ref 10–37)
BASOPHILS # BLD AUTO: 0 X10'3 (ref 0–0.2)
BASOPHILS NFR BLD AUTO: 0.4 % (ref 0–1)
BILIRUB SERPL-MCNC: 1 MG/DL (ref 0.1–1)
BUN SERPL-MCNC: 58 MG/DL (ref 7–18)
BUN/CREAT SERPL: 29.4 (ref 5.4–32)
CALCIUM SERPL-MCNC: 8.5 MG/DL (ref 8.5–10.1)
CHLORIDE SERPL-SCNC: 102 MMOL/L (ref 99–107)
CO2 SERPL-SCNC: 32.9 MMOL/L (ref 24–32)
CREAT SERPL-MCNC: 1.97 MG/DL (ref 0.6–1.1)
D DIMER PPP FEU-MCNC: 2.13 MG/L FEU (ref 0–0.5)
EOSINOPHIL # BLD AUTO: 0.3 X10'3 (ref 0–0.9)
EOSINOPHIL NFR BLD AUTO: 7.2 % (ref 0–6)
ERYTHROCYTE [DISTWIDTH] IN BLOOD BY AUTOMATED COUNT: 22.8 % (ref 11.5–14.5)
GFR SERPL CREATININE-BSD FRML MDRD: 32 ML/MIN
GLUCOSE SERPL-MCNC: 92 MG/DL (ref 70–104)
HCT VFR BLD AUTO: 29.2 % (ref 42–52)
HGB BLD-MCNC: 9.4 G/DL (ref 14–17.9)
LYMPHOCYTES # BLD AUTO: 0.2 X10'3 (ref 1.1–4.8)
LYMPHOCYTES NFR BLD AUTO: 4 % (ref 21–51)
MCH RBC QN AUTO: 28.6 PG (ref 27–31)
MCHC RBC AUTO-ENTMCNC: 32.1 G/DL (ref 33–36.5)
MCV RBC AUTO: 89.1 FL (ref 78–98)
MONOCYTES # BLD AUTO: 0.5 X10'3 (ref 0–0.9)
MONOCYTES NFR BLD AUTO: 11.6 % (ref 2–12)
NEUTROPHILS # BLD AUTO: 3.6 X10'3 (ref 1.8–7.7)
NEUTROPHILS NFR BLD AUTO: 76.8 % (ref 42–75)
PLATELET # BLD AUTO: 96 X10'3 (ref 140–440)
PLATELET BLD QL SMEAR: (no result)
PMV BLD AUTO: 7.1 FL (ref 7.4–10.4)
POTASSIUM SERPL-SCNC: 4.2 MMOL/L (ref 3.5–5.1)
PROT SERPL-MCNC: 7.7 G/DL (ref 6.4–8.2)
RBC # BLD AUTO: 3.28 X10'6 (ref 4.7–6.1)
RBC MORPH BLD: (no result)
SODIUM SERPL-SCNC: 140 MMOL/L (ref 135–145)
WBC # BLD AUTO: 4.7 X10'3 (ref 4.5–11)

## 2019-08-19 PROCEDURE — 87081 CULTURE SCREEN ONLY: CPT

## 2019-08-19 PROCEDURE — 83880 ASSAY OF NATRIURETIC PEPTIDE: CPT

## 2019-08-19 PROCEDURE — 99285 EMERGENCY DEPT VISIT HI MDM: CPT

## 2019-08-19 PROCEDURE — 94667 MNPJ CHEST WALL 1ST: CPT

## 2019-08-19 PROCEDURE — 94760 N-INVAS EAR/PLS OXIMETRY 1: CPT

## 2019-08-19 PROCEDURE — 83735 ASSAY OF MAGNESIUM: CPT

## 2019-08-19 PROCEDURE — 36415 COLL VENOUS BLD VENIPUNCTURE: CPT

## 2019-08-19 PROCEDURE — 97530 THERAPEUTIC ACTIVITIES: CPT

## 2019-08-19 PROCEDURE — 32555 ASPIRATE PLEURA W/ IMAGING: CPT

## 2019-08-19 PROCEDURE — 85730 THROMBOPLASTIN TIME PARTIAL: CPT

## 2019-08-19 PROCEDURE — 94640 AIRWAY INHALATION TREATMENT: CPT

## 2019-08-19 PROCEDURE — 76937 US GUIDE VASCULAR ACCESS: CPT

## 2019-08-19 PROCEDURE — 85025 COMPLETE CBC W/AUTO DIFF WBC: CPT

## 2019-08-19 PROCEDURE — 97161 PT EVAL LOW COMPLEX 20 MIN: CPT

## 2019-08-19 PROCEDURE — 80053 COMPREHEN METABOLIC PANEL: CPT

## 2019-08-19 PROCEDURE — 96374 THER/PROPH/DIAG INJ IV PUSH: CPT

## 2019-08-19 PROCEDURE — 80061 LIPID PANEL: CPT

## 2019-08-19 PROCEDURE — 97116 GAIT TRAINING THERAPY: CPT

## 2019-08-19 PROCEDURE — 71045 X-RAY EXAM CHEST 1 VIEW: CPT

## 2019-08-19 PROCEDURE — 84484 ASSAY OF TROPONIN QUANT: CPT

## 2019-08-19 PROCEDURE — 85610 PROTHROMBIN TIME: CPT

## 2019-08-19 PROCEDURE — 78582 LUNG VENTILAT&PERFUS IMAGING: CPT

## 2019-08-19 PROCEDURE — 85379 FIBRIN DEGRADATION QUANT: CPT

## 2019-08-19 PROCEDURE — 96375 TX/PRO/DX INJ NEW DRUG ADDON: CPT

## 2019-08-19 RX ADMIN — BUDESONIDE SCH MG: 0.5 INHALANT RESPIRATORY (INHALATION) at 21:13

## 2019-08-19 RX ADMIN — FUROSEMIDE SCH MG: 10 INJECTION, SOLUTION INTRAMUSCULAR; INTRAVENOUS at 20:38

## 2019-08-19 RX ADMIN — HEPARIN SODIUM AND DEXTROSE SCH MLS/HR: 10000; 5 INJECTION INTRAVENOUS at 15:55

## 2019-08-19 RX ADMIN — ALBUTEROL SULFATE SCH MG: 2.5 SOLUTION RESPIRATORY (INHALATION) at 21:13

## 2019-08-19 RX ADMIN — LATANOPROST SCH DROP: 50 SOLUTION OPHTHALMIC at 20:38

## 2019-08-19 RX ADMIN — DOXEPIN HYDROCHLORIDE SCH MG: 10 CAPSULE ORAL at 22:30

## 2019-08-19 NOTE — NUR
Critical  @2300. Per protocol hold for 1hr and decrease rate 2units/kg/hr. Will 
restart heparin gtt at 0000.

## 2019-08-19 NOTE — NUR
Problems reprioritized. Patient report given, questions answered & plan of care reviewed 
with Roxana ORTEGA.

## 2019-08-19 NOTE — NUR
[facility]  CALLED, LUNG SCAN WONT BE DONE TODAY.  THERE PHARMACY IS ALREADY 
CLOSED AND CANT GET THE MEDS.  WILL BE DONE 1ST THING IN THE MORNING.

## 2019-08-19 NOTE — NUR
SPOKE TO MARCIE AT [facility] WHO SPOKE TO KATIE KEYS FOR UPDATE OF PT PAST 
ER VISITS

## 2019-08-20 VITALS — SYSTOLIC BLOOD PRESSURE: 124 MMHG | DIASTOLIC BLOOD PRESSURE: 70 MMHG

## 2019-08-20 VITALS — DIASTOLIC BLOOD PRESSURE: 88 MMHG | SYSTOLIC BLOOD PRESSURE: 146 MMHG

## 2019-08-20 VITALS — DIASTOLIC BLOOD PRESSURE: 82 MMHG | SYSTOLIC BLOOD PRESSURE: 153 MMHG

## 2019-08-20 LAB
ALBUMIN SERPL BCP-MCNC: 3.3 G/DL (ref 3.4–5)
ALBUMIN/GLOB SERPL: 0.7 {RATIO} (ref 1.1–1.5)
ALP SERPL-CCNC: 150 IU/L (ref 46–116)
ALT SERPL W P-5'-P-CCNC: 27 U/L (ref 12–78)
ANION GAP SERPL CALCULATED.3IONS-SCNC: 7 MMOL/L (ref 8–16)
ANISOCYTOSIS BLD QL SMEAR: (no result)
AST SERPL W P-5'-P-CCNC: 27 U/L (ref 10–37)
BASOPHILS # BLD AUTO: 0 X10'3 (ref 0–0.2)
BASOPHILS NFR BLD AUTO: 0.5 % (ref 0–1)
BILIRUB SERPL-MCNC: 1.5 MG/DL (ref 0.1–1)
BUN SERPL-MCNC: 58 MG/DL (ref 7–18)
BUN/CREAT SERPL: 27.8 (ref 5.4–32)
CALCIUM SERPL-MCNC: 8.7 MG/DL (ref 8.5–10.1)
CHLORIDE SERPL-SCNC: 99 MMOL/L (ref 99–107)
CHOLEST SERPL-MCNC: 125 MG/DL (ref 0–200)
CHOLEST/HDLC SERPL: 1.9 {RATIO} (ref 0–4.99)
CO2 SERPL-SCNC: 33.6 MMOL/L (ref 24–32)
CREAT SERPL-MCNC: 2.09 MG/DL (ref 0.6–1.1)
EOSINOPHIL # BLD AUTO: 0.4 X10'3 (ref 0–0.9)
EOSINOPHIL NFR BLD AUTO: 9.3 % (ref 0–6)
ERYTHROCYTE [DISTWIDTH] IN BLOOD BY AUTOMATED COUNT: 22.4 % (ref 11.5–14.5)
GFR SERPL CREATININE-BSD FRML MDRD: 30 ML/MIN
GLUCOSE SERPL-MCNC: 87 MG/DL (ref 70–104)
HCT VFR BLD AUTO: 29.5 % (ref 42–52)
HDLC SERPL-MCNC: 67 MG/DL (ref 35–60)
HGB BLD-MCNC: 9.6 G/DL (ref 14–17.9)
LDLC SERPL DIRECT ASSAY-MCNC: 55 MG/DL (ref 50–100)
LYMPHOCYTES # BLD AUTO: 0.3 X10'3 (ref 1.1–4.8)
LYMPHOCYTES NFR BLD AUTO: 5.5 % (ref 21–51)
MAGNESIUM SERPL-MCNC: 1.5 MG/DL (ref 1.5–2.4)
MCH RBC QN AUTO: 28.8 PG (ref 27–31)
MCHC RBC AUTO-ENTMCNC: 32.5 G/DL (ref 33–36.5)
MCV RBC AUTO: 88.7 FL (ref 78–98)
MONOCYTES # BLD AUTO: 0.6 X10'3 (ref 0–0.9)
MONOCYTES NFR BLD AUTO: 12.8 % (ref 2–12)
NEUTROPHILS # BLD AUTO: 3.4 X10'3 (ref 1.8–7.7)
NEUTROPHILS NFR BLD AUTO: 71.9 % (ref 42–75)
OVALOCYTES BLD QL SMEAR: (no result)
PLATELET # BLD AUTO: 101 X10'3 (ref 140–440)
PLATELET BLD QL SMEAR: (no result)
PMV BLD AUTO: 7 FL (ref 7.4–10.4)
POTASSIUM SERPL-SCNC: 3.8 MMOL/L (ref 3.5–5.1)
PROT SERPL-MCNC: 7.8 G/DL (ref 6.4–8.2)
RBC # BLD AUTO: 3.33 X10'6 (ref 4.7–6.1)
RBC MORPH BLD: (no result)
SCHISTOCYTES BLD QL SMEAR: (no result)
SODIUM SERPL-SCNC: 140 MMOL/L (ref 135–145)
TRIGL SERPL-MCNC: 26 MG/DL (ref 20–135)
WBC # BLD AUTO: 4.7 X10'3 (ref 4.5–11)

## 2019-08-20 PROCEDURE — CB121ZZ PLANAR NUCLEAR MEDICINE IMAGING OF LUNGS AND BRONCHI USING TECHNETIUM 99M (TC-99M): ICD-10-PCS

## 2019-08-20 RX ADMIN — BUDESONIDE SCH MG: 0.5 INHALANT RESPIRATORY (INHALATION) at 09:28

## 2019-08-20 RX ADMIN — PANTOPRAZOLE SODIUM SCH MG: 40 TABLET, DELAYED RELEASE ORAL at 10:11

## 2019-08-20 RX ADMIN — ALBUTEROL SULFATE SCH MG: 2.5 SOLUTION RESPIRATORY (INHALATION) at 09:00

## 2019-08-20 RX ADMIN — HEPARIN SODIUM AND DEXTROSE SCH MLS/HR: 10000; 5 INJECTION INTRAVENOUS at 07:06

## 2019-08-20 RX ADMIN — HEPARIN SODIUM AND DEXTROSE SCH MLS/HR: 10000; 5 INJECTION INTRAVENOUS at 00:03

## 2019-08-20 RX ADMIN — ALBUTEROL SULFATE SCH MG: 2.5 SOLUTION RESPIRATORY (INHALATION) at 02:58

## 2019-08-20 RX ADMIN — LATANOPROST SCH DROP: 50 SOLUTION OPHTHALMIC at 20:16

## 2019-08-20 RX ADMIN — FUROSEMIDE SCH MG: 10 INJECTION, SOLUTION INTRAMUSCULAR; INTRAVENOUS at 10:11

## 2019-08-20 RX ADMIN — ALBUTEROL SULFATE SCH MG: 2.5 SOLUTION RESPIRATORY (INHALATION) at 20:47

## 2019-08-20 RX ADMIN — DOXEPIN HYDROCHLORIDE SCH MG: 10 CAPSULE ORAL at 20:15

## 2019-08-20 RX ADMIN — HEPARIN SODIUM AND DEXTROSE SCH MLS/HR: 10000; 5 INJECTION INTRAVENOUS at 13:30

## 2019-08-20 RX ADMIN — BUDESONIDE SCH MG: 0.5 INHALANT RESPIRATORY (INHALATION) at 20:47

## 2019-08-20 RX ADMIN — ALBUTEROL SULFATE SCH MG: 2.5 SOLUTION RESPIRATORY (INHALATION) at 14:25

## 2019-08-20 RX ADMIN — FUROSEMIDE SCH MG: 10 INJECTION, SOLUTION INTRAMUSCULAR; INTRAVENOUS at 20:11

## 2019-08-20 RX ADMIN — THERA TABS SCH EACH: TAB at 10:15

## 2019-08-20 RX ADMIN — HEPARIN SODIUM AND DEXTROSE SCH MLS/HR: 10000; 5 INJECTION INTRAVENOUS at 07:28

## 2019-08-20 NOTE — NUR
Patient in room JEFF 356. I have received report from  EPIFANIO ORTEGA AND ISIDRO ORTEGA  and had the 
opportunity to ask questions and assume patient care.

## 2019-08-20 NOTE — NUR
Problems reprioritized. Patient report given, questions answered & plan of care reviewed 
with BELLE GRAYSON RN.

## 2019-08-20 NOTE — NUR
Patient in room JEFF 356. I have received report from Roxana ORTEGA  and had the opportunity to 
ask questions and assume patient care.

## 2019-08-20 NOTE — NUR
Patient in room JEFF 356. I have received report from   Roxana  and had the opportunity to 
ask questions and assume patient care.

## 2019-08-21 VITALS — DIASTOLIC BLOOD PRESSURE: 82 MMHG | SYSTOLIC BLOOD PRESSURE: 153 MMHG

## 2019-08-21 VITALS — SYSTOLIC BLOOD PRESSURE: 139 MMHG | DIASTOLIC BLOOD PRESSURE: 77 MMHG

## 2019-08-21 VITALS — SYSTOLIC BLOOD PRESSURE: 144 MMHG | DIASTOLIC BLOOD PRESSURE: 60 MMHG

## 2019-08-21 VITALS — SYSTOLIC BLOOD PRESSURE: 143 MMHG | DIASTOLIC BLOOD PRESSURE: 68 MMHG

## 2019-08-21 VITALS — SYSTOLIC BLOOD PRESSURE: 128 MMHG | DIASTOLIC BLOOD PRESSURE: 71 MMHG

## 2019-08-21 LAB
ALBUMIN SERPL BCP-MCNC: 3.1 G/DL (ref 3.4–5)
ALBUMIN/GLOB SERPL: 0.7 {RATIO} (ref 1.1–1.5)
ALP SERPL-CCNC: 144 IU/L (ref 46–116)
ALT SERPL W P-5'-P-CCNC: 27 U/L (ref 12–78)
ANION GAP SERPL CALCULATED.3IONS-SCNC: 5 MMOL/L (ref 8–16)
ANISOCYTOSIS BLD QL SMEAR: (no result)
AST SERPL W P-5'-P-CCNC: 16 U/L (ref 10–37)
BASOPHILS # BLD AUTO: 0 X10'3 (ref 0–0.2)
BASOPHILS NFR BLD AUTO: 0.2 % (ref 0–1)
BILIRUB SERPL-MCNC: 1.1 MG/DL (ref 0.1–1)
BUN SERPL-MCNC: 60 MG/DL (ref 7–18)
BUN/CREAT SERPL: 28.3 (ref 5.4–32)
CALCIUM SERPL-MCNC: 8.9 MG/DL (ref 8.5–10.1)
CHLORIDE SERPL-SCNC: 100 MMOL/L (ref 99–107)
CO2 SERPL-SCNC: 34.3 MMOL/L (ref 24–32)
CREAT SERPL-MCNC: 2.12 MG/DL (ref 0.6–1.1)
EOSINOPHIL # BLD AUTO: 0.2 X10'3 (ref 0–0.9)
EOSINOPHIL NFR BLD AUTO: 5.9 % (ref 0–6)
EOSINOPHIL NFR BLD MANUAL: 8 % (ref 0–6)
ERYTHROCYTE [DISTWIDTH] IN BLOOD BY AUTOMATED COUNT: 23.2 % (ref 11.5–14.5)
GFR SERPL CREATININE-BSD FRML MDRD: 30 ML/MIN
GLUCOSE SERPL-MCNC: 89 MG/DL (ref 70–104)
HCT VFR BLD AUTO: 27.1 % (ref 42–52)
HGB BLD-MCNC: 9 G/DL (ref 14–17.9)
LG PLATELETS BLD QL SMEAR: (no result)
LYMPHOCYTES # BLD AUTO: 0.2 X10'3 (ref 1.1–4.8)
LYMPHOCYTES NFR BLD AUTO: 4.7 % (ref 21–51)
LYMPHOCYTES NFR BLD MANUAL: 4 % (ref 21–51)
MAGNESIUM SERPL-MCNC: 1.4 MG/DL (ref 1.5–2.4)
MCH RBC QN AUTO: 28.9 PG (ref 27–31)
MCHC RBC AUTO-ENTMCNC: 33.1 G/DL (ref 33–36.5)
MCV RBC AUTO: 87.4 FL (ref 78–98)
MONOCYTES # BLD AUTO: 0.6 X10'3 (ref 0–0.9)
MONOCYTES NFR BLD AUTO: 15.4 % (ref 2–12)
MONOCYTES NFR BLD MANUAL: 17 % (ref 2–12)
NEUTROPHILS # BLD AUTO: 3.1 X10'3 (ref 1.8–7.7)
NEUTROPHILS NFR BLD AUTO: 73.8 % (ref 42–75)
NEUTS SEG NFR BLD MANUAL: 71 % (ref 42–75)
PLATELET # BLD AUTO: 84 X10'3 (ref 140–440)
PLATELET BLD QL SMEAR: (no result)
PMV BLD AUTO: 7.1 FL (ref 7.4–10.4)
POTASSIUM SERPL-SCNC: 3.8 MMOL/L (ref 3.5–5.1)
PROT SERPL-MCNC: 7.3 G/DL (ref 6.4–8.2)
RBC # BLD AUTO: 3.1 X10'6 (ref 4.7–6.1)
RBC MORPH BLD: (no result)
SODIUM SERPL-SCNC: 139 MMOL/L (ref 135–145)
TOTAL CELLS COUNTED FLD: 100
WBC # BLD AUTO: 4.1 X10'3 (ref 4.5–11)

## 2019-08-21 PROCEDURE — 0W993ZZ DRAINAGE OF RIGHT PLEURAL CAVITY, PERCUTANEOUS APPROACH: ICD-10-PCS | Performed by: RADIOLOGY

## 2019-08-21 PROCEDURE — 0W9B3ZZ DRAINAGE OF LEFT PLEURAL CAVITY, PERCUTANEOUS APPROACH: ICD-10-PCS | Performed by: RADIOLOGY

## 2019-08-21 RX ADMIN — PANTOPRAZOLE SODIUM SCH MG: 40 TABLET, DELAYED RELEASE ORAL at 08:39

## 2019-08-21 RX ADMIN — ALBUTEROL SULFATE SCH MG: 2.5 SOLUTION RESPIRATORY (INHALATION) at 15:35

## 2019-08-21 RX ADMIN — BUDESONIDE SCH MG: 0.5 INHALANT RESPIRATORY (INHALATION) at 09:05

## 2019-08-21 RX ADMIN — THERA TABS SCH EACH: TAB at 08:39

## 2019-08-21 RX ADMIN — ALBUTEROL SULFATE SCH MG: 2.5 SOLUTION RESPIRATORY (INHALATION) at 09:05

## 2019-08-21 RX ADMIN — FUROSEMIDE SCH MG: 10 INJECTION, SOLUTION INTRAMUSCULAR; INTRAVENOUS at 08:39

## 2019-08-21 RX ADMIN — ALBUTEROL SULFATE SCH MG: 2.5 SOLUTION RESPIRATORY (INHALATION) at 02:56

## 2019-08-21 NOTE — NUR
patient awaiting ride at time of report, will be discharged home via taxi, appears to be 
stable for transfer.Report given to Waldemar ORTEGA

## 2019-08-21 NOTE — NUR
patient seen by Dr Riggs is for discharge. patient states he is having a hard time getting  
a ride home . Call put into family awaiting call back. All DC instructions given to patient.

## 2019-08-21 NOTE — NUR
patient had thorocentesis. Left side 650mls drained and right side 1150 mls drained. patient 
stable following procedure.

## 2019-08-21 NOTE — NUR
PATIENT LEFT FOR DISCHARGE WITH A CAB TO [place] IN A WHEELCHAIR WITH STAFF DOWN TO THE 
FRONT DOOR TO RIDE THE CAB.

## 2019-08-21 NOTE — NUR
Patient in room JEFF 356. I have received report from  LAUREANO ORTEGA  and had the opportunity to 
ask questions and assume patient care. PATIENT READY FOR DISCHARGE VIA CAB, JUST WAITING FOR 
CAB TO PICK HIM UP. DISCHARGE INSTRUCTIONS GIVEN BY LAUREANO ORTEGA AND SIGNED BY PATIENT. PATIENT 
DRESSED AND BELONGINGS PACKED.

## 2019-08-21 NOTE — NUR
Patient in room JEFF 356. I have received report from Waldemar ORTEGA   and had the opportunity to 
ask questions and assume patient care.

## 2019-08-21 NOTE — NUR
Problems reprioritized. Patient report given, questions answered & plan of care reviewed 
with LAUREANO ORETGA.

## 2019-10-10 ENCOUNTER — HOSPITAL ENCOUNTER (INPATIENT)
Dept: HOSPITAL 94 - ER | Age: 84
LOS: 3 days | Discharge: NURSING, REHAB OR LONG-TERM CARE | DRG: 177 | End: 2019-10-13
Attending: FAMILY MEDICINE | Admitting: FAMILY MEDICINE
Payer: MEDICARE

## 2019-10-10 ENCOUNTER — HOSPITAL ENCOUNTER (OUTPATIENT)
Dept: HOSPITAL 94 - SSTAY O | Age: 84
Discharge: ANOTHER HOSPITAL | End: 2019-10-10
Attending: RADIOLOGY
Payer: MEDICARE

## 2019-10-10 VITALS — WEIGHT: 85.19 LBS | BODY MASS INDEX: 11.54 KG/M2 | HEIGHT: 72 IN

## 2019-10-10 VITALS — DIASTOLIC BLOOD PRESSURE: 62 MMHG | SYSTOLIC BLOOD PRESSURE: 118 MMHG

## 2019-10-10 VITALS — DIASTOLIC BLOOD PRESSURE: 68 MMHG | SYSTOLIC BLOOD PRESSURE: 119 MMHG

## 2019-10-10 VITALS — SYSTOLIC BLOOD PRESSURE: 139 MMHG | DIASTOLIC BLOOD PRESSURE: 61 MMHG

## 2019-10-10 VITALS — HEIGHT: 72 IN | WEIGHT: 188.27 LBS | BODY MASS INDEX: 25.5 KG/M2

## 2019-10-10 VITALS — DIASTOLIC BLOOD PRESSURE: 75 MMHG | SYSTOLIC BLOOD PRESSURE: 141 MMHG

## 2019-10-10 VITALS — SYSTOLIC BLOOD PRESSURE: 113 MMHG | DIASTOLIC BLOOD PRESSURE: 63 MMHG

## 2019-10-10 VITALS — SYSTOLIC BLOOD PRESSURE: 134 MMHG | DIASTOLIC BLOOD PRESSURE: 73 MMHG

## 2019-10-10 VITALS — DIASTOLIC BLOOD PRESSURE: 73 MMHG | SYSTOLIC BLOOD PRESSURE: 119 MMHG

## 2019-10-10 DIAGNOSIS — J96.90: ICD-10-CM

## 2019-10-10 DIAGNOSIS — E87.5: ICD-10-CM

## 2019-10-10 DIAGNOSIS — J15.6: Primary | ICD-10-CM

## 2019-10-10 DIAGNOSIS — Z53.8: ICD-10-CM

## 2019-10-10 DIAGNOSIS — Z66: ICD-10-CM

## 2019-10-10 DIAGNOSIS — G47.33: ICD-10-CM

## 2019-10-10 DIAGNOSIS — Z86.73: ICD-10-CM

## 2019-10-10 DIAGNOSIS — J91.8: ICD-10-CM

## 2019-10-10 DIAGNOSIS — I50.33: ICD-10-CM

## 2019-10-10 DIAGNOSIS — R40.4: ICD-10-CM

## 2019-10-10 DIAGNOSIS — D64.9: ICD-10-CM

## 2019-10-10 DIAGNOSIS — G93.41: ICD-10-CM

## 2019-10-10 DIAGNOSIS — R18.8: Primary | ICD-10-CM

## 2019-10-10 DIAGNOSIS — K92.2: ICD-10-CM

## 2019-10-10 DIAGNOSIS — M10.9: ICD-10-CM

## 2019-10-10 DIAGNOSIS — D46.9: ICD-10-CM

## 2019-10-10 DIAGNOSIS — J69.0: ICD-10-CM

## 2019-10-10 DIAGNOSIS — I48.91: ICD-10-CM

## 2019-10-10 DIAGNOSIS — K74.60: ICD-10-CM

## 2019-10-10 DIAGNOSIS — Z99.81: ICD-10-CM

## 2019-10-10 DIAGNOSIS — N18.9: ICD-10-CM

## 2019-10-10 DIAGNOSIS — I13.0: ICD-10-CM

## 2019-10-10 DIAGNOSIS — J44.1: ICD-10-CM
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
## 2019-10-10 DIAGNOSIS — I27.81: ICD-10-CM

## 2019-10-10 DIAGNOSIS — Z95.0: ICD-10-CM

## 2019-10-10 DIAGNOSIS — J15.9: ICD-10-CM

## 2019-10-10 LAB
ALBUMIN SERPL BCP-MCNC: 2.8 G/DL (ref 3.4–5)
ALBUMIN/GLOB SERPL: 0.5 {RATIO} (ref 1.1–1.5)
ALP SERPL-CCNC: 189 IU/L (ref 46–116)
ALT SERPL W P-5'-P-CCNC: 35 U/L (ref 12–78)
ANION GAP SERPL CALCULATED.3IONS-SCNC: 3 MMOL/L (ref 8–16)
AST SERPL W P-5'-P-CCNC: 37 U/L (ref 10–37)
BASE EXCESS BLDA CALC-SCNC: 7.3 MMOL/L (ref -2–3)
BASE EXCESS BLDA CALC-SCNC: 9.3 MMOL/L (ref -2–3)
BASE EXCESS BLDA CALC-SCNC: 9.4 MMOL/L (ref -2–3)
BASOPHILS # BLD AUTO: 0 X10'3 (ref 0–0.2)
BASOPHILS NFR BLD AUTO: 0.1 % (ref 0–1)
BILIRUB SERPL-MCNC: 0.6 MG/DL (ref 0.1–1)
BODY TEMPERATURE: 37
BUN SERPL-MCNC: 88 MG/DL (ref 7–18)
BUN/CREAT SERPL: 41.1 (ref 5.4–32)
CALCIUM SERPL-MCNC: 8.9 MG/DL (ref 8.5–10.1)
CHLORIDE SERPL-SCNC: 102 MMOL/L (ref 99–107)
CLARITY UR: CLEAR
CO2 SERPL-SCNC: 35.8 MMOL/L (ref 24–32)
COHGB MFR BLDA: 1.1 % (ref 0.5–1.5)
COHGB MFR BLDA: 1.2 % (ref 0.5–1.5)
COHGB MFR BLDA: 1.2 % (ref 0.5–1.5)
COLOR UR: YELLOW
CREAT SERPL-MCNC: 2.14 MG/DL (ref 0.6–1.1)
EOSINOPHIL # BLD AUTO: 0.1 X10'3 (ref 0–0.9)
EOSINOPHIL NFR BLD AUTO: 2.5 % (ref 0–6)
ERYTHROCYTE [DISTWIDTH] IN BLOOD BY AUTOMATED COUNT: 17.4 % (ref 11.5–14.5)
ERYTHROCYTE [DISTWIDTH] IN BLOOD BY AUTOMATED COUNT: 17.5 % (ref 11.5–14.5)
GFR SERPL CREATININE-BSD FRML MDRD: 29 ML/MIN
GLUCOSE SERPL-MCNC: 94 MG/DL (ref 70–104)
GLUCOSE UR STRIP-MCNC: NEGATIVE MG/DL
HCO3 BLDA-SCNC: 34.3 MMOL/L (ref 22–26)
HCO3 BLDA-SCNC: 36.2 MMOL/L (ref 22–26)
HCO3 BLDA-SCNC: 36.3 MMOL/L (ref 22–26)
HCT VFR BLD AUTO: 22.3 % (ref 42–52)
HCT VFR BLD AUTO: 25.1 % (ref 42–52)
HGB BLD-MCNC: 7.3 G/DL (ref 14–17.9)
HGB BLD-MCNC: 8.2 G/DL (ref 14–17.9)
HGB BLDA-MCNC: 7.9 G/DL (ref 14–17.9)
HGB BLDA-MCNC: 8.3 G/DL (ref 14–17.9)
HGB BLDA-MCNC: 8.4 G/DL (ref 14–17.9)
HGB UR QL STRIP: NEGATIVE
INHALED O2 FLOW RATE: 2 L/MIN
INHALED O2 FLOW RATE: 4 L/MIN
KETONES UR STRIP-MCNC: NEGATIVE MG/DL
LEUKOCYTE ESTERASE UR QL STRIP: NEGATIVE
LYMPHOCYTES # BLD AUTO: 0.2 X10'3 (ref 1.1–4.8)
LYMPHOCYTES NFR BLD AUTO: 4.9 % (ref 21–51)
MCH RBC QN AUTO: 29.4 PG (ref 27–31)
MCH RBC QN AUTO: 29.6 PG (ref 27–31)
MCHC RBC AUTO-ENTMCNC: 32.6 G/DL (ref 33–36.5)
MCHC RBC AUTO-ENTMCNC: 32.7 G/DL (ref 33–36.5)
MCV RBC AUTO: 90.1 FL (ref 78–98)
MCV RBC AUTO: 90.4 FL (ref 78–98)
METHGB MFR BLDA: 0.1 % (ref 0.3–1.12)
METHGB MFR BLDA: 0.1 % (ref 0.3–1.12)
METHGB MFR BLDA: 0.2 % (ref 0.3–1.12)
MONOCYTES # BLD AUTO: 0.6 X10'3 (ref 0–0.9)
MONOCYTES NFR BLD AUTO: 12.6 % (ref 2–12)
NEUTROPHILS # BLD AUTO: 3.8 X10'3 (ref 1.8–7.7)
NEUTROPHILS NFR BLD AUTO: 79.9 % (ref 42–75)
NITRITE UR QL STRIP: NEGATIVE
O2/TOTAL GAS SETTING VFR VENT: 30 MMHG/%
OXYHGB MFR BLDA: 94 % (ref 94–100)
OXYHGB MFR BLDA: 95.9 % (ref 94–100)
OXYHGB MFR BLDA: 96 % (ref 94–100)
PCO2 TEMP ADJ BLDA: 64.6 MMHG (ref 35–45)
PCO2 TEMP ADJ BLDA: 66.6 MMHG (ref 35–45)
PCO2 TEMP ADJ BLDA: 66.6 MMHG (ref 35–45)
PH TEMP ADJ BLDA: 7.33 [PH] (ref 7.35–7.45)
PH TEMP ADJ BLDA: 7.35 [PH] (ref 7.35–7.45)
PH TEMP ADJ BLDA: 7.37 [PH] (ref 7.35–7.45)
PH UR STRIP: 5.5 [PH] (ref 4.8–8)
PLATELET # BLD AUTO: 104 X10'3 (ref 140–440)
PLATELET # BLD AUTO: 108 X10'3 (ref 140–440)
PMV BLD AUTO: 6.5 FL (ref 7.4–10.4)
PMV BLD AUTO: 6.9 FL (ref 7.4–10.4)
PO2 TEMP ADJ BLD: 107.7 MMHG (ref 83–108)
PO2 TEMP ADJ BLD: 79.7 MMHG (ref 83–108)
PO2 TEMP ADJ BLD: 99.2 MMHG (ref 83–108)
POTASSIUM SERPL-SCNC: 5.4 MMOL/L (ref 3.5–5.1)
PROT SERPL-MCNC: 8.1 G/DL (ref 6.4–8.2)
PROT UR QL STRIP: NEGATIVE MG/DL
RBC # BLD AUTO: 2.48 X10'6 (ref 4.7–6.1)
RBC # BLD AUTO: 2.78 X10'6 (ref 4.7–6.1)
RESP RATE RESTING: 30 B/MIN
SAO2 % BLDA: 95.3 % (ref 95–98)
SAO2 % BLDA: 97.1 % (ref 95–98)
SAO2 % BLDA: 97.3 % (ref 95–98)
SODIUM SERPL-SCNC: 141 MMOL/L (ref 135–145)
SP GR UR STRIP: 1.01 (ref 1–1.03)
URN COLLECT METHOD CLASS: (no result)
UROBILINOGEN UR STRIP-MCNC: 0.2 E.U/DL (ref 0.2–1)
WBC # BLD AUTO: 4.2 X10'3 (ref 4.5–11)
WBC # BLD AUTO: 4.7 X10'3 (ref 4.5–11)

## 2019-10-10 PROCEDURE — 82948 REAGENT STRIP/BLOOD GLUCOSE: CPT

## 2019-10-10 PROCEDURE — 86900 BLOOD TYPING SEROLOGIC ABO: CPT

## 2019-10-10 PROCEDURE — 99285 EMERGENCY DEPT VISIT HI MDM: CPT

## 2019-10-10 PROCEDURE — 71045 X-RAY EXAM CHEST 1 VIEW: CPT

## 2019-10-10 PROCEDURE — 86885 COOMBS TEST INDIRECT QUAL: CPT

## 2019-10-10 PROCEDURE — 86920 COMPATIBILITY TEST SPIN: CPT

## 2019-10-10 PROCEDURE — 94660 CPAP INITIATION&MGMT: CPT

## 2019-10-10 PROCEDURE — 97161 PT EVAL LOW COMPLEX 20 MIN: CPT

## 2019-10-10 PROCEDURE — 84157 ASSAY OF PROTEIN OTHER: CPT

## 2019-10-10 PROCEDURE — 30233N1 TRANSFUSION OF NONAUTOLOGOUS RED BLOOD CELLS INTO PERIPHERAL VEIN, PERCUTANEOUS APPROACH: ICD-10-PCS | Performed by: EMERGENCY MEDICINE

## 2019-10-10 PROCEDURE — 80053 COMPREHEN METABOLIC PANEL: CPT

## 2019-10-10 PROCEDURE — 97530 THERAPEUTIC ACTIVITIES: CPT

## 2019-10-10 PROCEDURE — 85610 PROTHROMBIN TIME: CPT

## 2019-10-10 PROCEDURE — 87040 BLOOD CULTURE FOR BACTERIA: CPT

## 2019-10-10 PROCEDURE — 84100 ASSAY OF PHOSPHORUS: CPT

## 2019-10-10 PROCEDURE — 32555 ASPIRATE PLEURA W/ IMAGING: CPT

## 2019-10-10 PROCEDURE — 92508 TX SP LANG VOICE COMM GROUP: CPT

## 2019-10-10 PROCEDURE — 86901 BLOOD TYPING SEROLOGIC RH(D): CPT

## 2019-10-10 PROCEDURE — 87070 CULTURE OTHR SPECIMN AEROBIC: CPT

## 2019-10-10 PROCEDURE — 84145 PROCALCITONIN (PCT): CPT

## 2019-10-10 PROCEDURE — 81003 URINALYSIS AUTO W/O SCOPE: CPT

## 2019-10-10 PROCEDURE — 82803 BLOOD GASES ANY COMBINATION: CPT

## 2019-10-10 PROCEDURE — 36600 WITHDRAWAL OF ARTERIAL BLOOD: CPT

## 2019-10-10 PROCEDURE — 96374 THER/PROPH/DIAG INJ IV PUSH: CPT

## 2019-10-10 PROCEDURE — 92616 FEES W/LARYNGEAL SENSE TEST: CPT

## 2019-10-10 PROCEDURE — 97116 GAIT TRAINING THERAPY: CPT

## 2019-10-10 PROCEDURE — 83615 LACTATE (LD) (LDH) ENZYME: CPT

## 2019-10-10 PROCEDURE — 36415 COLL VENOUS BLD VENIPUNCTURE: CPT

## 2019-10-10 PROCEDURE — 71046 X-RAY EXAM CHEST 2 VIEWS: CPT

## 2019-10-10 PROCEDURE — 36430 TRANSFUSION BLD/BLD COMPNT: CPT

## 2019-10-10 PROCEDURE — 83605 ASSAY OF LACTIC ACID: CPT

## 2019-10-10 PROCEDURE — 83735 ASSAY OF MAGNESIUM: CPT

## 2019-10-10 PROCEDURE — 94760 N-INVAS EAR/PLS OXIMETRY 1: CPT

## 2019-10-10 PROCEDURE — 87081 CULTURE SCREEN ONLY: CPT

## 2019-10-10 PROCEDURE — 5A09357 ASSISTANCE WITH RESPIRATORY VENTILATION, LESS THAN 24 CONSECUTIVE HOURS, CONTINUOUS POSITIVE AIRWAY PRESSURE: ICD-10-PCS | Performed by: EMERGENCY MEDICINE

## 2019-10-10 PROCEDURE — 85027 COMPLETE CBC AUTOMATED: CPT

## 2019-10-10 PROCEDURE — 93005 ELECTROCARDIOGRAM TRACING: CPT

## 2019-10-10 PROCEDURE — 84484 ASSAY OF TROPONIN QUANT: CPT

## 2019-10-10 PROCEDURE — 94640 AIRWAY INHALATION TREATMENT: CPT

## 2019-10-10 PROCEDURE — 82272 OCCULT BLD FECES 1-3 TESTS: CPT

## 2019-10-10 PROCEDURE — 70450 CT HEAD/BRAIN W/O DYE: CPT

## 2019-10-10 PROCEDURE — 82945 GLUCOSE OTHER FLUID: CPT

## 2019-10-10 PROCEDURE — 85025 COMPLETE CBC W/AUTO DIFF WBC: CPT

## 2019-10-10 PROCEDURE — 97112 NEUROMUSCULAR REEDUCATION: CPT

## 2019-10-10 PROCEDURE — 85018 HEMOGLOBIN: CPT

## 2019-10-10 PROCEDURE — 89051 BODY FLUID CELL COUNT: CPT

## 2019-10-10 PROCEDURE — 96375 TX/PRO/DX INJ NEW DRUG ADDON: CPT

## 2019-10-10 PROCEDURE — 97110 THERAPEUTIC EXERCISES: CPT

## 2019-10-10 PROCEDURE — 85730 THROMBOPLASTIN TIME PARTIAL: CPT

## 2019-10-10 PROCEDURE — 83986 ASSAY PH BODY FLUID NOS: CPT

## 2019-10-10 PROCEDURE — 80202 ASSAY OF VANCOMYCIN: CPT

## 2019-10-10 RX ADMIN — ALBUTEROL SULFATE SCH MG: 2.5 SOLUTION RESPIRATORY (INHALATION) at 20:15

## 2019-10-10 RX ADMIN — LEVOFLOXACIN SCH MLS/HR: 500 INJECTION, SOLUTION INTRAVENOUS at 18:08

## 2019-10-10 RX ADMIN — PANTOPRAZOLE SODIUM SCH MG: 40 TABLET, DELAYED RELEASE ORAL at 22:09

## 2019-10-10 RX ADMIN — BUDESONIDE SCH MG: 0.5 INHALANT RESPIRATORY (INHALATION) at 20:14

## 2019-10-10 RX ADMIN — DOXEPIN HYDROCHLORIDE SCH MG: 10 CAPSULE ORAL at 22:10

## 2019-10-10 RX ADMIN — SODIUM CHLORIDE SCH MLS/HR: 9 INJECTION INTRAMUSCULAR; INTRAVENOUS; SUBCUTANEOUS at 18:11

## 2019-10-10 NOTE — NUR
CALLED [facility] ON [address] -[phone] TO OBTAIN A COPY OF THE 
PATIENT'S POLST. I SPOKE TO LENI AND  PROVIDED OUR ER FAX NUMBER. LENI 
WILL FAX THE POLST

## 2019-10-10 NOTE — NUR
Patient taken to ER for evaluation of ALOC. Pt unable to answer questions for Lucian BO. 
Spoke with nurse at [facility], aware of plan.

## 2019-10-10 NOTE — NUR
Called Dr. Harden because patient had course crackles throughout while blood was 
transfusing and after receiving ordered IV antibiotics. Pt O2 sat 100% on bipap but he was 
showing s/s of respiratory distress. JVD present bilaterally. Received order for one time 
dose of Lasix 80mg IVP and to hold 2nd unit of ordered blood until a recheck of H&H.

## 2019-10-10 NOTE — NUR
PATIENT TRANSFERED FROM [facility] TO [facility] BED. PATIENT ABLE TO STAND WITH ONE 
PERSON MAX ASSIST, ASSISTED WITH URINAL, DTI TO COCCYX AND MEDIAL BUTTOCKS, 
PRIMARY RN MAHENDRA AWARE OF DTI. PATIENT DRANK 120 ML H2O VIA STRAW, NO COUGH.

## 2019-10-11 VITALS — DIASTOLIC BLOOD PRESSURE: 61 MMHG | SYSTOLIC BLOOD PRESSURE: 131 MMHG

## 2019-10-11 VITALS — DIASTOLIC BLOOD PRESSURE: 74 MMHG | SYSTOLIC BLOOD PRESSURE: 124 MMHG

## 2019-10-11 VITALS — DIASTOLIC BLOOD PRESSURE: 52 MMHG | SYSTOLIC BLOOD PRESSURE: 124 MMHG

## 2019-10-11 VITALS — SYSTOLIC BLOOD PRESSURE: 132 MMHG | DIASTOLIC BLOOD PRESSURE: 70 MMHG

## 2019-10-11 VITALS — DIASTOLIC BLOOD PRESSURE: 65 MMHG | SYSTOLIC BLOOD PRESSURE: 129 MMHG

## 2019-10-11 VITALS — SYSTOLIC BLOOD PRESSURE: 127 MMHG | DIASTOLIC BLOOD PRESSURE: 62 MMHG

## 2019-10-11 VITALS — SYSTOLIC BLOOD PRESSURE: 141 MMHG | DIASTOLIC BLOOD PRESSURE: 74 MMHG

## 2019-10-11 VITALS — DIASTOLIC BLOOD PRESSURE: 65 MMHG | SYSTOLIC BLOOD PRESSURE: 131 MMHG

## 2019-10-11 LAB
ALBUMIN SERPL BCP-MCNC: 2.6 G/DL (ref 3.4–5)
ALBUMIN/GLOB SERPL: 0.5 {RATIO} (ref 1.1–1.5)
ALP SERPL-CCNC: 185 IU/L (ref 46–116)
ALT SERPL W P-5'-P-CCNC: 32 U/L (ref 12–78)
ANION GAP SERPL CALCULATED.3IONS-SCNC: 6 MMOL/L (ref 8–16)
APPEARANCE SPUN FLD: (no result)
APTT PPP: 30 SECONDS (ref 22–32)
AST SERPL W P-5'-P-CCNC: 35 U/L (ref 10–37)
BASOPHILS # BLD AUTO: 0 X10'3 (ref 0–0.2)
BASOPHILS NFR BLD AUTO: 0 % (ref 0–1)
BILIRUB SERPL-MCNC: 0.6 MG/DL (ref 0.1–1)
BODY FLD TYPE: (no result)
BUN SERPL-MCNC: 97 MG/DL (ref 7–18)
BUN/CREAT SERPL: 43.3 (ref 5.4–32)
CALCIUM SERPL-MCNC: 9 MG/DL (ref 8.5–10.1)
CHLORIDE SERPL-SCNC: 98 MMOL/L (ref 99–107)
CO2 SERPL-SCNC: 35.2 MMOL/L (ref 24–32)
COLOR FLD: (no result)
CREAT SERPL-MCNC: 2.24 MG/DL (ref 0.6–1.1)
EOSINOPHIL # BLD AUTO: 0 X10'3 (ref 0–0.9)
EOSINOPHIL NFR BLD AUTO: 0.2 % (ref 0–6)
EOSINOPHIL NFR FLD MANUAL: 1 %
ERYTHROCYTE [DISTWIDTH] IN BLOOD BY AUTOMATED COUNT: 17.6 % (ref 11.5–14.5)
GFR SERPL CREATININE-BSD FRML MDRD: 28 ML/MIN
GLUCOSE FLD-MCNC: 127 MG/DL
GLUCOSE SERPL-MCNC: 175 MG/DL (ref 70–104)
HCT VFR BLD AUTO: 24.2 % (ref 42–52)
HGB BLD-MCNC: 8 G/DL (ref 14–17.9)
LDH FLD-CCNC: 129 U/L
LDH SERPL-CCNC: 138 U/L (ref 85–227)
LYMPHOCYTES # BLD AUTO: 0.1 X10'3 (ref 1.1–4.8)
LYMPHOCYTES NFR BLD AUTO: 3.7 % (ref 21–51)
LYMPHOCYTES NFR FLD MANUAL: 59 %
MAGNESIUM SERPL-MCNC: 1.9 MG/DL (ref 1.5–2.4)
MCH RBC QN AUTO: 29.5 PG (ref 27–31)
MCHC RBC AUTO-ENTMCNC: 33.2 G/DL (ref 33–36.5)
MCV RBC AUTO: 88.7 FL (ref 78–98)
MONOCYTES # BLD AUTO: 0 X10'3 (ref 0–0.9)
MONOCYTES NFR BLD AUTO: 1.5 % (ref 2–12)
MONOCYTES NFR FLD MANUAL: 27 %
NEUTROPHILS # BLD AUTO: 2.9 X10'3 (ref 1.8–7.7)
NEUTROPHILS NFR BLD AUTO: 94.6 % (ref 42–75)
NEUTROPHILS NFR FLD MANUAL: 13 %
PH FLD: 8 [PH] (ref 7.63–7.65)
PHOSPHATE SERPL-MCNC: 5.6 MG/DL (ref 2.3–4.5)
PLATELET # BLD AUTO: 109 X10'3 (ref 140–440)
PMV BLD AUTO: 6.9 FL (ref 7.4–10.4)
POTASSIUM SERPL-SCNC: 4.6 MMOL/L (ref 3.5–5.1)
PROT FLD-MCNC: 3.6 G/DL
PROT SERPL-MCNC: 7.9 G/DL (ref 6.4–8.2)
RBC # BLD AUTO: 2.73 X10'6 (ref 4.7–6.1)
RBC # FLD MANUAL: (no result) /CU MM
SODIUM SERPL-SCNC: 139 MMOL/L (ref 135–145)
SPECIMEN VOL FLD: 52 ML
VANCOMYCIN SERPL-MCNC: 6.7 UG/ML (ref 6–14)
WBC # BLD AUTO: 3.1 X10'3 (ref 4.5–11)
WBC # FLD MANUAL: 500 /CU MM (ref 0–1000)

## 2019-10-11 PROCEDURE — 0W993ZZ DRAINAGE OF RIGHT PLEURAL CAVITY, PERCUTANEOUS APPROACH: ICD-10-PCS | Performed by: RADIOLOGY

## 2019-10-11 RX ADMIN — BUDESONIDE SCH MG: 0.5 INHALANT RESPIRATORY (INHALATION) at 19:47

## 2019-10-11 RX ADMIN — ALBUTEROL SULFATE SCH MG: 2.5 SOLUTION RESPIRATORY (INHALATION) at 19:45

## 2019-10-11 RX ADMIN — TAZOBACTAM SODIUM AND PIPERACILLIN SODIUM SCH MLS/HR: 375; 3 INJECTION, SOLUTION INTRAVENOUS at 11:34

## 2019-10-11 RX ADMIN — PANTOPRAZOLE SODIUM SCH MG: 40 TABLET, DELAYED RELEASE ORAL at 08:57

## 2019-10-11 RX ADMIN — TAZOBACTAM SODIUM AND PIPERACILLIN SODIUM SCH MLS/HR: 375; 3 INJECTION, SOLUTION INTRAVENOUS at 03:43

## 2019-10-11 RX ADMIN — TAZOBACTAM SODIUM AND PIPERACILLIN SODIUM SCH MLS/HR: 375; 3 INJECTION, SOLUTION INTRAVENOUS at 19:40

## 2019-10-11 RX ADMIN — ALBUTEROL SULFATE SCH MG: 2.5 SOLUTION RESPIRATORY (INHALATION) at 02:21

## 2019-10-11 RX ADMIN — PANTOPRAZOLE SODIUM SCH MG: 40 TABLET, DELAYED RELEASE ORAL at 19:41

## 2019-10-11 RX ADMIN — DOCUSATE SODIUM SCH MG: 100 CAPSULE, LIQUID FILLED ORAL at 08:57

## 2019-10-11 RX ADMIN — BUDESONIDE SCH MG: 0.5 INHALANT RESPIRATORY (INHALATION) at 08:05

## 2019-10-11 RX ADMIN — ALBUTEROL SULFATE SCH MG: 2.5 SOLUTION RESPIRATORY (INHALATION) at 08:06

## 2019-10-11 RX ADMIN — LEVOFLOXACIN SCH MLS/HR: 500 INJECTION, SOLUTION INTRAVENOUS at 08:58

## 2019-10-11 RX ADMIN — DOXEPIN HYDROCHLORIDE SCH MG: 10 CAPSULE ORAL at 21:41

## 2019-10-11 RX ADMIN — THERA TABS SCH EACH: TAB at 08:57

## 2019-10-11 RX ADMIN — ALBUTEROL SULFATE SCH MG: 2.5 SOLUTION RESPIRATORY (INHALATION) at 14:58

## 2019-10-11 NOTE — NUR
Received call from Arslan of [facility], she states that she faxed copy of culture and 
sensitivity of urine as well as of preliminary report of blood culture. She states that 
culture/sensitivity of blood should be available tomorrow, 10/12. Both urine and blood 
culture are positive. Check fax machine after call, no report received as of this time. 

-------------------------------------------------------------------------------

Addendum: 10/12/19 at 0631 by Sudarshan Gaytan RN

-------------------------------------------------------------------------------

Wrong Pt.

## 2019-10-11 NOTE — NUR
Patient in room PCU 3026. I have received report from  JORDAN Anna from ER  and had the 
opportunity to ask questions and assume patient care. Patient arrived on BiPap and 
respiratory staff members helping.

## 2019-10-11 NOTE — NUR
Orientee Medication Administration:

For this medication-pass time frame, all medication were reviewed, dispensed, administered 
and documented per hospital policy by Yesenia ORTEGA. 



Orientee documentation:

I have reviewed interventions, assessments performed and documented by Yesenia ORTEGA.

## 2019-10-11 NOTE — NUR
Patient in room PCU 3026. I have received report from Hanna ORTEGA and had the opportunity to ask 
questions and assume patient care.

## 2019-10-11 NOTE — NUR
PAGER ID:  [phone] 

MESSAGE:  RE: Faustino Pettit. Room: 302. fluid from morning thoracentesis resulted: Rare 
gram positive Cocci in pairs. -Sudarshan [facility] #[id number]



Dr. Rachel paged concerning Pts fluid from thoracentesis results.

## 2019-10-11 NOTE — NUR
Problems reprioritized. Patient report given, questions answered & plan of care reviewed 
with Sudarshan ORTEGA.

## 2019-10-12 VITALS — DIASTOLIC BLOOD PRESSURE: 70 MMHG | SYSTOLIC BLOOD PRESSURE: 139 MMHG

## 2019-10-12 VITALS — SYSTOLIC BLOOD PRESSURE: 127 MMHG | DIASTOLIC BLOOD PRESSURE: 63 MMHG

## 2019-10-12 VITALS — SYSTOLIC BLOOD PRESSURE: 138 MMHG | DIASTOLIC BLOOD PRESSURE: 63 MMHG

## 2019-10-12 VITALS — DIASTOLIC BLOOD PRESSURE: 59 MMHG | SYSTOLIC BLOOD PRESSURE: 132 MMHG

## 2019-10-12 VITALS — DIASTOLIC BLOOD PRESSURE: 59 MMHG | SYSTOLIC BLOOD PRESSURE: 125 MMHG

## 2019-10-12 VITALS — SYSTOLIC BLOOD PRESSURE: 151 MMHG | DIASTOLIC BLOOD PRESSURE: 96 MMHG

## 2019-10-12 VITALS — SYSTOLIC BLOOD PRESSURE: 127 MMHG | DIASTOLIC BLOOD PRESSURE: 66 MMHG

## 2019-10-12 VITALS — SYSTOLIC BLOOD PRESSURE: 125 MMHG | DIASTOLIC BLOOD PRESSURE: 64 MMHG

## 2019-10-12 VITALS — DIASTOLIC BLOOD PRESSURE: 62 MMHG | SYSTOLIC BLOOD PRESSURE: 135 MMHG

## 2019-10-12 VITALS — DIASTOLIC BLOOD PRESSURE: 63 MMHG | SYSTOLIC BLOOD PRESSURE: 129 MMHG

## 2019-10-12 VITALS — SYSTOLIC BLOOD PRESSURE: 133 MMHG | DIASTOLIC BLOOD PRESSURE: 66 MMHG

## 2019-10-12 LAB
ALBUMIN SERPL BCP-MCNC: 2.4 G/DL (ref 3.4–5)
ALBUMIN/GLOB SERPL: 0.5 {RATIO} (ref 1.1–1.5)
ALP SERPL-CCNC: 150 IU/L (ref 46–116)
ALT SERPL W P-5'-P-CCNC: 27 U/L (ref 12–78)
ANION GAP SERPL CALCULATED.3IONS-SCNC: 8 MMOL/L (ref 8–16)
AST SERPL W P-5'-P-CCNC: 26 U/L (ref 10–37)
BASOPHILS # BLD AUTO: 0 X10'3 (ref 0–0.2)
BASOPHILS # BLD AUTO: 0 X10'3 (ref 0–0.2)
BASOPHILS NFR BLD AUTO: 0 % (ref 0–1)
BASOPHILS NFR BLD AUTO: 0.1 % (ref 0–1)
BILIRUB SERPL-MCNC: 0.5 MG/DL (ref 0.1–1)
BUN SERPL-MCNC: 99 MG/DL (ref 7–18)
BUN/CREAT SERPL: 39 (ref 5.4–32)
CALCIUM SERPL-MCNC: 8.7 MG/DL (ref 8.5–10.1)
CHLORIDE SERPL-SCNC: 95 MMOL/L (ref 99–107)
CO2 SERPL-SCNC: 35 MMOL/L (ref 24–32)
CREAT SERPL-MCNC: 2.54 MG/DL (ref 0.6–1.1)
EOSINOPHIL # BLD AUTO: 0 X10'3 (ref 0–0.9)
EOSINOPHIL # BLD AUTO: 0 X10'3 (ref 0–0.9)
EOSINOPHIL NFR BLD AUTO: 0 % (ref 0–6)
EOSINOPHIL NFR BLD AUTO: 0 % (ref 0–6)
ERYTHROCYTE [DISTWIDTH] IN BLOOD BY AUTOMATED COUNT: 17.5 % (ref 11.5–14.5)
ERYTHROCYTE [DISTWIDTH] IN BLOOD BY AUTOMATED COUNT: 17.8 % (ref 11.5–14.5)
GFR SERPL CREATININE-BSD FRML MDRD: 24 ML/MIN
GLUCOSE SERPL-MCNC: 150 MG/DL (ref 70–104)
HCT VFR BLD AUTO: 21.8 % (ref 42–52)
HCT VFR BLD AUTO: 24.3 % (ref 42–52)
HGB BLD-MCNC: 7.3 G/DL (ref 14–17.9)
HGB BLD-MCNC: 8.3 G/DL (ref 14–17.9)
LYMPHOCYTES # BLD AUTO: 0.1 X10'3 (ref 1.1–4.8)
LYMPHOCYTES # BLD AUTO: 0.1 X10'3 (ref 1.1–4.8)
LYMPHOCYTES NFR BLD AUTO: 2.4 % (ref 21–51)
LYMPHOCYTES NFR BLD AUTO: 2.5 % (ref 21–51)
MAGNESIUM SERPL-MCNC: 1.8 MG/DL (ref 1.5–2.4)
MCH RBC QN AUTO: 29.6 PG (ref 27–31)
MCH RBC QN AUTO: 29.7 PG (ref 27–31)
MCHC RBC AUTO-ENTMCNC: 33.4 G/DL (ref 33–36.5)
MCHC RBC AUTO-ENTMCNC: 34.2 G/DL (ref 33–36.5)
MCV RBC AUTO: 86.6 FL (ref 78–98)
MCV RBC AUTO: 89 FL (ref 78–98)
MONOCYTES # BLD AUTO: 0.2 X10'3 (ref 0–0.9)
MONOCYTES # BLD AUTO: 0.2 X10'3 (ref 0–0.9)
MONOCYTES NFR BLD AUTO: 4.2 % (ref 2–12)
MONOCYTES NFR BLD AUTO: 4.2 % (ref 2–12)
NEUTROPHILS # BLD AUTO: 4.8 X10'3 (ref 1.8–7.7)
NEUTROPHILS # BLD AUTO: 5.4 X10'3 (ref 1.8–7.7)
NEUTROPHILS NFR BLD AUTO: 93.3 % (ref 42–75)
NEUTROPHILS NFR BLD AUTO: 93.3 % (ref 42–75)
PHOSPHATE SERPL-MCNC: 4.7 MG/DL (ref 2.3–4.5)
PLATELET # BLD AUTO: 105 X10'3 (ref 140–440)
PLATELET # BLD AUTO: 108 X10'3 (ref 140–440)
PMV BLD AUTO: 6.6 FL (ref 7.4–10.4)
PMV BLD AUTO: 6.9 FL (ref 7.4–10.4)
POTASSIUM SERPL-SCNC: 4.1 MMOL/L (ref 3.5–5.1)
PROT SERPL-MCNC: 7.1 G/DL (ref 6.4–8.2)
RBC # BLD AUTO: 2.45 X10'6 (ref 4.7–6.1)
RBC # BLD AUTO: 2.8 X10'6 (ref 4.7–6.1)
SODIUM SERPL-SCNC: 138 MMOL/L (ref 135–145)
VANCOMYCIN SERPL-MCNC: 11.7 UG/ML (ref 6–14)
WBC # BLD AUTO: 5.2 X10'3 (ref 4.5–11)
WBC # BLD AUTO: 5.7 X10'3 (ref 4.5–11)

## 2019-10-12 PROCEDURE — 30233N1 TRANSFUSION OF NONAUTOLOGOUS RED BLOOD CELLS INTO PERIPHERAL VEIN, PERCUTANEOUS APPROACH: ICD-10-PCS | Performed by: EMERGENCY MEDICINE

## 2019-10-12 RX ADMIN — ALBUTEROL SULFATE SCH MG: 2.5 SOLUTION RESPIRATORY (INHALATION) at 02:11

## 2019-10-12 RX ADMIN — PANTOPRAZOLE SODIUM SCH MG: 40 TABLET, DELAYED RELEASE ORAL at 21:14

## 2019-10-12 RX ADMIN — TAZOBACTAM SODIUM AND PIPERACILLIN SODIUM SCH MLS/HR: 375; 3 INJECTION, SOLUTION INTRAVENOUS at 09:51

## 2019-10-12 RX ADMIN — ALBUTEROL SULFATE SCH MG: 2.5 SOLUTION RESPIRATORY (INHALATION) at 07:36

## 2019-10-12 RX ADMIN — DOXEPIN HYDROCHLORIDE SCH MG: 10 CAPSULE ORAL at 21:15

## 2019-10-12 RX ADMIN — THERA TABS SCH EACH: TAB at 08:18

## 2019-10-12 RX ADMIN — BUDESONIDE SCH MG: 0.5 INHALANT RESPIRATORY (INHALATION) at 19:59

## 2019-10-12 RX ADMIN — Medication SCH MMU: at 21:14

## 2019-10-12 RX ADMIN — ALBUTEROL SULFATE SCH MG: 2.5 SOLUTION RESPIRATORY (INHALATION) at 15:09

## 2019-10-12 RX ADMIN — DOCUSATE SODIUM SCH MG: 100 CAPSULE, LIQUID FILLED ORAL at 08:19

## 2019-10-12 RX ADMIN — ALBUTEROL SULFATE SCH MG: 2.5 SOLUTION RESPIRATORY (INHALATION) at 19:59

## 2019-10-12 RX ADMIN — SODIUM CHLORIDE SCH MLS/HR: 9 INJECTION INTRAMUSCULAR; INTRAVENOUS; SUBCUTANEOUS at 16:44

## 2019-10-12 RX ADMIN — LEVOFLOXACIN SCH MLS/HR: 5 INJECTION, SOLUTION INTRAVENOUS at 08:20

## 2019-10-12 RX ADMIN — BUDESONIDE SCH MG: 0.5 INHALANT RESPIRATORY (INHALATION) at 07:36

## 2019-10-12 RX ADMIN — TAZOBACTAM SODIUM AND PIPERACILLIN SODIUM SCH MLS/HR: 375; 3 INJECTION, SOLUTION INTRAVENOUS at 21:15

## 2019-10-12 RX ADMIN — PANTOPRAZOLE SODIUM SCH MG: 40 TABLET, DELAYED RELEASE ORAL at 08:19

## 2019-10-12 NOTE — NUR
PAGER ID:  [phone] 

MESSAGE:  RE: Faustino Pettit, Room: [facility]. H /H post blood transfusion 8.3/24.3. Do you 
want to transfuse another unit? -Sudarshan [facility] #[id number]



Dr. Rachel paged concerning Pt's H/H post blood transfusion

## 2019-10-12 NOTE — NUR
Problems reprioritized. Patient report given, questions answered & plan of care reviewed 
with Myra ORTEGA.

## 2019-10-12 NOTE — NUR
Notified MD of +MRSA nares.



PAGER ID:  [phone] 

MESSAGE:  [id number] HAM Pettit Nares MRSA+, pancho [address]ala [id number]

## 2019-10-12 NOTE — NUR
Patient in room PCU 3026. I have received report from Myra ORTEGA and had the opportunity to 
ask questions and assume patient care.

## 2019-10-13 VITALS — DIASTOLIC BLOOD PRESSURE: 66 MMHG | SYSTOLIC BLOOD PRESSURE: 140 MMHG

## 2019-10-13 VITALS — SYSTOLIC BLOOD PRESSURE: 134 MMHG | DIASTOLIC BLOOD PRESSURE: 60 MMHG

## 2019-10-13 VITALS — SYSTOLIC BLOOD PRESSURE: 122 MMHG | DIASTOLIC BLOOD PRESSURE: 63 MMHG

## 2019-10-13 LAB
ALBUMIN SERPL BCP-MCNC: 2.5 G/DL (ref 3.4–5)
ALBUMIN/GLOB SERPL: 0.5 {RATIO} (ref 1.1–1.5)
ALP SERPL-CCNC: 154 IU/L (ref 46–116)
ALT SERPL W P-5'-P-CCNC: 27 U/L (ref 12–78)
ANION GAP SERPL CALCULATED.3IONS-SCNC: 9 MMOL/L (ref 8–16)
AST SERPL W P-5'-P-CCNC: 23 U/L (ref 10–37)
BASOPHILS # BLD AUTO: 0 X10'3 (ref 0–0.2)
BASOPHILS NFR BLD AUTO: 0.1 % (ref 0–1)
BILIRUB SERPL-MCNC: 0.6 MG/DL (ref 0.1–1)
BUN SERPL-MCNC: 95 MG/DL (ref 7–18)
BUN/CREAT SERPL: 34.8 (ref 5.4–32)
CALCIUM SERPL-MCNC: 9 MG/DL (ref 8.5–10.1)
CHLORIDE SERPL-SCNC: 95 MMOL/L (ref 99–107)
CO2 SERPL-SCNC: 32.6 MMOL/L (ref 24–32)
CREAT SERPL-MCNC: 2.73 MG/DL (ref 0.6–1.1)
EOSINOPHIL # BLD AUTO: 0 X10'3 (ref 0–0.9)
EOSINOPHIL NFR BLD AUTO: 0 % (ref 0–6)
ERYTHROCYTE [DISTWIDTH] IN BLOOD BY AUTOMATED COUNT: 17.8 % (ref 11.5–14.5)
GFR SERPL CREATININE-BSD FRML MDRD: 22 ML/MIN
GLUCOSE SERPL-MCNC: 212 MG/DL (ref 70–104)
HCT VFR BLD AUTO: 25.2 % (ref 42–52)
HEMOCCULT STL QL: POSITIVE
HGB BLD-MCNC: 8.5 G/DL (ref 14–17.9)
LYMPHOCYTES # BLD AUTO: 0.1 X10'3 (ref 1.1–4.8)
LYMPHOCYTES NFR BLD AUTO: 1.6 % (ref 21–51)
MAGNESIUM SERPL-MCNC: 1.7 MG/DL (ref 1.5–2.4)
MCH RBC QN AUTO: 29.3 PG (ref 27–31)
MCHC RBC AUTO-ENTMCNC: 33.5 G/DL (ref 33–36.5)
MCV RBC AUTO: 87.6 FL (ref 78–98)
MONOCYTES # BLD AUTO: 0.1 X10'3 (ref 0–0.9)
MONOCYTES NFR BLD AUTO: 3.3 % (ref 2–12)
NEUTROPHILS # BLD AUTO: 4.3 X10'3 (ref 1.8–7.7)
NEUTROPHILS NFR BLD AUTO: 95 % (ref 42–75)
PHOSPHATE SERPL-MCNC: 4.5 MG/DL (ref 2.3–4.5)
PLATELET # BLD AUTO: 104 X10'3 (ref 140–440)
PMV BLD AUTO: 6.6 FL (ref 7.4–10.4)
POTASSIUM SERPL-SCNC: 3.5 MMOL/L (ref 3.5–5.1)
PROT SERPL-MCNC: 7.4 G/DL (ref 6.4–8.2)
RBC # BLD AUTO: 2.88 X10'6 (ref 4.7–6.1)
SODIUM SERPL-SCNC: 137 MMOL/L (ref 135–145)
WBC # BLD AUTO: 4.5 X10'3 (ref 4.5–11)

## 2019-10-13 RX ADMIN — BUDESONIDE SCH MG: 0.5 INHALANT RESPIRATORY (INHALATION) at 09:32

## 2019-10-13 RX ADMIN — DOCUSATE SODIUM SCH MG: 100 CAPSULE, LIQUID FILLED ORAL at 07:53

## 2019-10-13 RX ADMIN — ALBUTEROL SULFATE SCH MG: 2.5 SOLUTION RESPIRATORY (INHALATION) at 02:11

## 2019-10-13 RX ADMIN — PANTOPRAZOLE SODIUM SCH MG: 40 TABLET, DELAYED RELEASE ORAL at 07:53

## 2019-10-13 RX ADMIN — THERA TABS SCH EACH: TAB at 07:53

## 2019-10-13 RX ADMIN — Medication SCH MMU: at 07:53

## 2019-10-13 RX ADMIN — LEVOFLOXACIN SCH MLS/HR: 5 INJECTION, SOLUTION INTRAVENOUS at 07:52

## 2019-10-13 RX ADMIN — ALBUTEROL SULFATE SCH MG: 2.5 SOLUTION RESPIRATORY (INHALATION) at 09:32

## 2019-10-13 RX ADMIN — TAZOBACTAM SODIUM AND PIPERACILLIN SODIUM SCH MLS/HR: 375; 3 INJECTION, SOLUTION INTRAVENOUS at 09:50

## 2019-10-13 NOTE — NUR
Problems reprioritized. Patient report given, questions answered & plan of care reviewed 
with Juliana ORTEGA at [facility].

## 2019-10-13 NOTE — NUR
Patient stable for discharge per MD orders. All discharge instructions reviewed with Juliana ORTEGA at [place] and all questions answered. Bedside monitor discontinued. Belongings 
collected and sent with patient. Patient left in rip cargo van and wheeled down by 
[employer].

## 2019-10-13 NOTE — NUR
Orientee documentation:

I have reviewed and agree with all interventions, assessments performed and documented by 
Kezia ORTEGA.



Orientee Medication Administration:

For this medication-pass time frame, all medication were reviewed, dispensed, administered 
and documented per hospital policy by Kezia ORTEGA.

## 2019-10-13 NOTE — NUR
Spoke with Dr. Rachel regarding patient. Patient has denied receiving colonoscopy despite 
positive occult stool culture.

## 2019-10-13 NOTE — NUR
Problems reprioritized. Patient report given, questions answered & plan of care reviewed 
with Diamante. .

## 2019-10-13 NOTE — NUR
Patient in room PCU 3026. I have received report from  Myra Michaud RN and had the 
opportunity to ask questions and assume patient care.

## 2019-10-13 NOTE — NUR
Orientation documentation:

I have reviewed and agree with all interventions, assessments performed and meds passed by 
Jaswant KWAN

## 2019-10-31 ENCOUNTER — HOSPITAL ENCOUNTER (OUTPATIENT)
Dept: HOSPITAL 94 - SSTAY O | Age: 84
Discharge: NURSING, REHAB OR LONG-TERM CARE | End: 2019-10-31
Attending: RADIOLOGY
Payer: MEDICARE

## 2019-10-31 VITALS — DIASTOLIC BLOOD PRESSURE: 70 MMHG | SYSTOLIC BLOOD PRESSURE: 127 MMHG

## 2019-10-31 VITALS — BODY MASS INDEX: 27.77 KG/M2 | HEIGHT: 72 IN | WEIGHT: 205.03 LBS

## 2019-10-31 VITALS — SYSTOLIC BLOOD PRESSURE: 121 MMHG | DIASTOLIC BLOOD PRESSURE: 62 MMHG

## 2019-10-31 VITALS — SYSTOLIC BLOOD PRESSURE: 129 MMHG | DIASTOLIC BLOOD PRESSURE: 87 MMHG

## 2019-10-31 VITALS — SYSTOLIC BLOOD PRESSURE: 125 MMHG | DIASTOLIC BLOOD PRESSURE: 67 MMHG

## 2019-10-31 VITALS — DIASTOLIC BLOOD PRESSURE: 71 MMHG | SYSTOLIC BLOOD PRESSURE: 114 MMHG

## 2019-10-31 VITALS — SYSTOLIC BLOOD PRESSURE: 127 MMHG | DIASTOLIC BLOOD PRESSURE: 70 MMHG

## 2019-10-31 VITALS — SYSTOLIC BLOOD PRESSURE: 106 MMHG | DIASTOLIC BLOOD PRESSURE: 71 MMHG

## 2019-10-31 DIAGNOSIS — J90: Primary | ICD-10-CM

## 2019-10-31 PROCEDURE — 32555 ASPIRATE PLEURA W/ IMAGING: CPT

## 2019-10-31 PROCEDURE — 71045 X-RAY EXAM CHEST 1 VIEW: CPT

## 2019-11-05 ENCOUNTER — HOSPITAL ENCOUNTER (OUTPATIENT)
Dept: HOSPITAL 94 - SSTAY O | Age: 84
Discharge: HOME | End: 2019-11-05
Attending: RADIOLOGY
Payer: MEDICARE

## 2019-11-05 VITALS — HEIGHT: 72 IN | WEIGHT: 213.19 LBS | BODY MASS INDEX: 28.88 KG/M2

## 2019-11-05 VITALS — DIASTOLIC BLOOD PRESSURE: 81 MMHG | SYSTOLIC BLOOD PRESSURE: 141 MMHG

## 2019-11-05 DIAGNOSIS — N18.9: ICD-10-CM

## 2019-11-05 DIAGNOSIS — I50.9: ICD-10-CM

## 2019-11-05 DIAGNOSIS — R14.0: Primary | ICD-10-CM

## 2019-11-05 DIAGNOSIS — J90: ICD-10-CM

## 2019-11-05 DIAGNOSIS — I13.0: ICD-10-CM

## 2019-11-05 DIAGNOSIS — J44.9: ICD-10-CM

## 2019-11-05 DIAGNOSIS — I48.91: ICD-10-CM

## 2019-11-05 DIAGNOSIS — Z98.890: ICD-10-CM

## 2019-11-05 DIAGNOSIS — Z86.73: ICD-10-CM

## 2019-11-05 DIAGNOSIS — Z79.899: ICD-10-CM

## 2019-11-05 PROCEDURE — 76705 ECHO EXAM OF ABDOMEN: CPT

## 2019-11-05 NOTE — NUR
pt procedure cancelled per AMILCAR Wagner NP

-------------------------------------------------------------------------------

Addendum: 11/05/19 at 1058 by Marielena Diaz RN

-------------------------------------------------------------------------------

pt stated,"I think I will wait a couple of days".

## 2019-11-18 ENCOUNTER — HOSPITAL ENCOUNTER (OUTPATIENT)
Dept: HOSPITAL 94 - SSTAY O | Age: 84
Discharge: NURSING, REHAB OR LONG-TERM CARE | End: 2019-11-18
Attending: RADIOLOGY
Payer: MEDICARE

## 2019-11-18 VITALS — DIASTOLIC BLOOD PRESSURE: 77 MMHG | SYSTOLIC BLOOD PRESSURE: 131 MMHG

## 2019-11-18 VITALS — SYSTOLIC BLOOD PRESSURE: 129 MMHG | DIASTOLIC BLOOD PRESSURE: 76 MMHG

## 2019-11-18 VITALS — SYSTOLIC BLOOD PRESSURE: 119 MMHG | DIASTOLIC BLOOD PRESSURE: 78 MMHG

## 2019-11-18 VITALS — DIASTOLIC BLOOD PRESSURE: 90 MMHG | SYSTOLIC BLOOD PRESSURE: 137 MMHG

## 2019-11-18 VITALS — WEIGHT: 223.55 LBS | BODY MASS INDEX: 30.28 KG/M2 | HEIGHT: 72 IN

## 2019-11-18 VITALS — DIASTOLIC BLOOD PRESSURE: 75 MMHG | SYSTOLIC BLOOD PRESSURE: 162 MMHG

## 2019-11-18 VITALS — DIASTOLIC BLOOD PRESSURE: 73 MMHG | SYSTOLIC BLOOD PRESSURE: 137 MMHG

## 2019-11-18 VITALS — DIASTOLIC BLOOD PRESSURE: 67 MMHG | SYSTOLIC BLOOD PRESSURE: 136 MMHG

## 2019-11-18 VITALS — DIASTOLIC BLOOD PRESSURE: 86 MMHG | SYSTOLIC BLOOD PRESSURE: 152 MMHG

## 2019-11-18 VITALS — SYSTOLIC BLOOD PRESSURE: 127 MMHG | DIASTOLIC BLOOD PRESSURE: 79 MMHG

## 2019-11-18 DIAGNOSIS — R18.8: ICD-10-CM

## 2019-11-18 DIAGNOSIS — J90: Primary | ICD-10-CM

## 2019-11-18 DIAGNOSIS — Z79.899: ICD-10-CM

## 2019-11-18 LAB
ALBUMIN SERPL BCP-MCNC: 2.7 G/DL (ref 3.4–5)
ANION GAP SERPL CALCULATED.3IONS-SCNC: 4 MMOL/L (ref 8–16)
BASOPHILS # BLD AUTO: 0 X10'3 (ref 0–0.2)
BASOPHILS NFR BLD AUTO: 0.5 % (ref 0–1)
BUN SERPL-MCNC: 50 MG/DL (ref 7–18)
BUN/CREAT SERPL: 22.1 (ref 5.4–32)
CALCIUM SERPL-MCNC: 8.6 MG/DL (ref 8.5–10.1)
CHLORIDE SERPL-SCNC: 104 MMOL/L (ref 99–107)
CO2 SERPL-SCNC: 33 MMOL/L (ref 24–32)
CREAT SERPL-MCNC: 2.26 MG/DL (ref 0.6–1.1)
EOSINOPHIL # BLD AUTO: 0.2 X10'3 (ref 0–0.9)
EOSINOPHIL NFR BLD AUTO: 4.1 % (ref 0–6)
ERYTHROCYTE [DISTWIDTH] IN BLOOD BY AUTOMATED COUNT: 18.5 % (ref 11.5–14.5)
GFR SERPL CREATININE-BSD FRML MDRD: 28 ML/MIN
GLUCOSE SERPL-MCNC: 137 MG/DL (ref 70–104)
HCT VFR BLD AUTO: 24.8 % (ref 42–52)
HGB BLD-MCNC: 8 G/DL (ref 14–17.9)
LYMPHOCYTES # BLD AUTO: 0.2 X10'3 (ref 1.1–4.8)
LYMPHOCYTES NFR BLD AUTO: 4.6 % (ref 21–51)
MCH RBC QN AUTO: 28.9 PG (ref 27–31)
MCHC RBC AUTO-ENTMCNC: 32.4 G/DL (ref 33–36.5)
MCV RBC AUTO: 89.3 FL (ref 78–98)
MONOCYTES # BLD AUTO: 0.8 X10'3 (ref 0–0.9)
MONOCYTES NFR BLD AUTO: 14.6 % (ref 2–12)
NEUTROPHILS # BLD AUTO: 4 X10'3 (ref 1.8–7.7)
NEUTROPHILS NFR BLD AUTO: 76.2 % (ref 42–75)
PLATELET # BLD AUTO: 143 X10'3 (ref 140–440)
PMV BLD AUTO: 6.4 FL (ref 7.4–10.4)
POTASSIUM SERPL-SCNC: 4.5 MMOL/L (ref 3.5–5.1)
RBC # BLD AUTO: 2.78 X10'6 (ref 4.7–6.1)
SODIUM SERPL-SCNC: 141 MMOL/L (ref 135–145)
WBC # BLD AUTO: 5.2 X10'3 (ref 4.5–11)

## 2019-11-18 PROCEDURE — 76705 ECHO EXAM OF ABDOMEN: CPT

## 2019-11-18 PROCEDURE — 85025 COMPLETE CBC W/AUTO DIFF WBC: CPT

## 2019-11-18 PROCEDURE — 80048 BASIC METABOLIC PNL TOTAL CA: CPT

## 2019-11-18 PROCEDURE — 85610 PROTHROMBIN TIME: CPT

## 2019-11-18 PROCEDURE — 32555 ASPIRATE PLEURA W/ IMAGING: CPT

## 2019-11-18 PROCEDURE — 71045 X-RAY EXAM CHEST 1 VIEW: CPT

## 2019-11-18 PROCEDURE — 36415 COLL VENOUS BLD VENIPUNCTURE: CPT

## 2019-11-18 NOTE — NUR
Recd. call from Dr. Torres that x-ray was dictated and I let him know that pt was discharged 
and that ALEXANDRA Tobar stated that pt

## 2019-11-18 NOTE — NUR
Recjeremi. call from Mila, nurse at [facility] and I was informed that Kayce Alvarez will be 
doing the chest x-ray at the facility.

## 2019-11-18 NOTE — NUR
per Harsh Diaz, FNP if pt is clinically stable pt is can be discharged and pt is to 
came back tomorrow for a chest x-ray. Notified Nurse at [facility] that pt needs to come 
back tomorrow for a f/up xray.

## 2019-11-29 ENCOUNTER — HOSPITAL ENCOUNTER (EMERGENCY)
Dept: HOSPITAL 94 - ER | Age: 84
Discharge: HOME | End: 2019-11-29
Payer: MEDICARE

## 2019-11-29 VITALS — SYSTOLIC BLOOD PRESSURE: 121 MMHG | DIASTOLIC BLOOD PRESSURE: 76 MMHG

## 2019-11-29 VITALS — BODY MASS INDEX: 30.16 KG/M2 | WEIGHT: 222.67 LBS | HEIGHT: 72 IN

## 2019-11-29 DIAGNOSIS — I13.0: ICD-10-CM

## 2019-11-29 DIAGNOSIS — J90: Primary | ICD-10-CM

## 2019-11-29 DIAGNOSIS — I50.9: ICD-10-CM

## 2019-11-29 DIAGNOSIS — Z98.890: ICD-10-CM

## 2019-11-29 DIAGNOSIS — N18.9: ICD-10-CM

## 2019-11-29 DIAGNOSIS — Z79.899: ICD-10-CM

## 2019-11-29 DIAGNOSIS — I48.91: ICD-10-CM

## 2019-11-29 DIAGNOSIS — Z86.73: ICD-10-CM

## 2019-11-29 DIAGNOSIS — J44.9: ICD-10-CM

## 2019-11-29 LAB
ALBUMIN SERPL BCP-MCNC: 2.6 G/DL (ref 3.4–5)
ALBUMIN/GLOB SERPL: 0.6 {RATIO} (ref 1.1–1.5)
ALP SERPL-CCNC: 116 IU/L (ref 46–116)
ALT SERPL W P-5'-P-CCNC: 15 U/L (ref 12–78)
ANION GAP SERPL CALCULATED.3IONS-SCNC: 5 MMOL/L (ref 8–16)
ANISOCYTOSIS BLD QL SMEAR: (no result)
APTT PPP: 27 SECONDS (ref 22–32)
AST SERPL W P-5'-P-CCNC: 26 U/L (ref 10–37)
BASOPHILS # BLD AUTO: 0 X10'3 (ref 0–0.2)
BASOPHILS NFR BLD AUTO: 0.4 % (ref 0–1)
BILIRUB SERPL-MCNC: 0.6 MG/DL (ref 0.1–1)
BUN SERPL-MCNC: 52 MG/DL (ref 7–18)
BUN/CREAT SERPL: 25.2 (ref 5.4–32)
CALCIUM SERPL-MCNC: 8.6 MG/DL (ref 8.5–10.1)
CHLORIDE SERPL-SCNC: 103 MMOL/L (ref 99–107)
CO2 SERPL-SCNC: 32 MMOL/L (ref 24–32)
CREAT SERPL-MCNC: 2.06 MG/DL (ref 0.6–1.1)
EOSINOPHIL # BLD AUTO: 0.4 X10'3 (ref 0–0.9)
EOSINOPHIL NFR BLD AUTO: 7.2 % (ref 0–6)
ERYTHROCYTE [DISTWIDTH] IN BLOOD BY AUTOMATED COUNT: 18.7 % (ref 11.5–14.5)
GFR SERPL CREATININE-BSD FRML MDRD: 31 ML/MIN
GLUCOSE SERPL-MCNC: 95 MG/DL (ref 70–104)
HCT VFR BLD AUTO: 24.8 % (ref 42–52)
HGB BLD-MCNC: 8 G/DL (ref 14–17.9)
HYPOCHROMIA BLD QL SMEAR: (no result)
LYMPHOCYTES # BLD AUTO: 0.3 X10'3 (ref 1.1–4.8)
LYMPHOCYTES NFR BLD AUTO: 4.8 % (ref 21–51)
MCH RBC QN AUTO: 28.7 PG (ref 27–31)
MCHC RBC AUTO-ENTMCNC: 32.2 G/DL (ref 33–36.5)
MCV RBC AUTO: 89 FL (ref 78–98)
MONOCYTES # BLD AUTO: 0.7 X10'3 (ref 0–0.9)
MONOCYTES NFR BLD AUTO: 12.5 % (ref 2–12)
NEUTROPHILS # BLD AUTO: 4.3 X10'3 (ref 1.8–7.7)
NEUTROPHILS NFR BLD AUTO: 75.1 % (ref 42–75)
PLATELET # BLD AUTO: 114 X10'3 (ref 140–440)
PLATELET BLD QL SMEAR: (no result)
PMV BLD AUTO: 6.6 FL (ref 7.4–10.4)
POLYCHROMASIA BLD QL SMEAR: (no result)
POTASSIUM SERPL-SCNC: 4.8 MMOL/L (ref 3.5–5.1)
PROT SERPL-MCNC: 7.2 G/DL (ref 6.4–8.2)
RBC # BLD AUTO: 2.79 X10'6 (ref 4.7–6.1)
RBC MORPH BLD: (no result)
ROULEAUX BLD QL SMEAR: (no result)
SODIUM SERPL-SCNC: 140 MMOL/L (ref 135–145)
WBC # BLD AUTO: 5.8 X10'3 (ref 4.5–11)

## 2019-11-29 PROCEDURE — 85730 THROMBOPLASTIN TIME PARTIAL: CPT

## 2019-11-29 PROCEDURE — 71045 X-RAY EXAM CHEST 1 VIEW: CPT

## 2019-11-29 PROCEDURE — 85025 COMPLETE CBC W/AUTO DIFF WBC: CPT

## 2019-11-29 PROCEDURE — 36415 COLL VENOUS BLD VENIPUNCTURE: CPT

## 2019-11-29 PROCEDURE — 85610 PROTHROMBIN TIME: CPT

## 2019-11-29 PROCEDURE — 83880 ASSAY OF NATRIURETIC PEPTIDE: CPT

## 2019-11-29 PROCEDURE — 93005 ELECTROCARDIOGRAM TRACING: CPT

## 2019-11-29 PROCEDURE — 80053 COMPREHEN METABOLIC PANEL: CPT

## 2019-11-29 PROCEDURE — 99284 EMERGENCY DEPT VISIT MOD MDM: CPT

## 2019-12-10 ENCOUNTER — HOSPITAL ENCOUNTER (OUTPATIENT)
Dept: HOSPITAL 94 - SSTAY O | Age: 84
Discharge: NURSING, REHAB OR LONG-TERM CARE | End: 2019-12-10
Attending: INTERNAL MEDICINE
Payer: MEDICARE

## 2019-12-10 VITALS — SYSTOLIC BLOOD PRESSURE: 131 MMHG | DIASTOLIC BLOOD PRESSURE: 75 MMHG

## 2019-12-10 VITALS — HEIGHT: 72 IN | WEIGHT: 229.5 LBS | BODY MASS INDEX: 31.08 KG/M2

## 2019-12-10 VITALS — DIASTOLIC BLOOD PRESSURE: 86 MMHG | SYSTOLIC BLOOD PRESSURE: 131 MMHG

## 2019-12-10 VITALS — SYSTOLIC BLOOD PRESSURE: 128 MMHG | DIASTOLIC BLOOD PRESSURE: 88 MMHG

## 2019-12-10 VITALS — DIASTOLIC BLOOD PRESSURE: 97 MMHG | SYSTOLIC BLOOD PRESSURE: 148 MMHG

## 2019-12-10 VITALS — DIASTOLIC BLOOD PRESSURE: 79 MMHG | SYSTOLIC BLOOD PRESSURE: 124 MMHG

## 2019-12-10 VITALS — SYSTOLIC BLOOD PRESSURE: 155 MMHG | DIASTOLIC BLOOD PRESSURE: 104 MMHG

## 2019-12-10 VITALS — SYSTOLIC BLOOD PRESSURE: 133 MMHG | DIASTOLIC BLOOD PRESSURE: 90 MMHG

## 2019-12-10 VITALS — SYSTOLIC BLOOD PRESSURE: 147 MMHG | DIASTOLIC BLOOD PRESSURE: 86 MMHG

## 2019-12-10 VITALS — SYSTOLIC BLOOD PRESSURE: 123 MMHG | DIASTOLIC BLOOD PRESSURE: 90 MMHG

## 2019-12-10 VITALS — DIASTOLIC BLOOD PRESSURE: 89 MMHG | SYSTOLIC BLOOD PRESSURE: 138 MMHG

## 2019-12-10 DIAGNOSIS — D64.9: Primary | ICD-10-CM

## 2019-12-10 PROCEDURE — 86920 COMPATIBILITY TEST SPIN: CPT

## 2019-12-10 PROCEDURE — 36415 COLL VENOUS BLD VENIPUNCTURE: CPT

## 2019-12-10 PROCEDURE — 86644 CMV ANTIBODY: CPT

## 2019-12-10 PROCEDURE — 36430 TRANSFUSION BLD/BLD COMPNT: CPT

## 2019-12-10 PROCEDURE — 86885 COOMBS TEST INDIRECT QUAL: CPT

## 2019-12-10 PROCEDURE — 86900 BLOOD TYPING SEROLOGIC ABO: CPT

## 2019-12-10 PROCEDURE — 30233N1 TRANSFUSION OF NONAUTOLOGOUS RED BLOOD CELLS INTO PERIPHERAL VEIN, PERCUTANEOUS APPROACH: ICD-10-PCS | Performed by: INTERNAL MEDICINE

## 2019-12-10 PROCEDURE — 86945 BLOOD PRODUCT/IRRADIATION: CPT

## 2019-12-10 PROCEDURE — 86901 BLOOD TYPING SEROLOGIC RH(D): CPT

## 2019-12-10 PROCEDURE — 76937 US GUIDE VASCULAR ACCESS: CPT

## 2019-12-12 ENCOUNTER — HOSPITAL ENCOUNTER (EMERGENCY)
Dept: HOSPITAL 94 - ER | Age: 84
Discharge: HOME | End: 2019-12-12
Payer: MEDICARE

## 2019-12-12 VITALS — HEIGHT: 73 IN | WEIGHT: 242.51 LBS | BODY MASS INDEX: 32.14 KG/M2

## 2019-12-12 VITALS — DIASTOLIC BLOOD PRESSURE: 96 MMHG | SYSTOLIC BLOOD PRESSURE: 136 MMHG

## 2019-12-12 DIAGNOSIS — R60.1: ICD-10-CM

## 2019-12-12 DIAGNOSIS — Z79.899: ICD-10-CM

## 2019-12-12 DIAGNOSIS — I13.0: ICD-10-CM

## 2019-12-12 DIAGNOSIS — Z98.890: ICD-10-CM

## 2019-12-12 DIAGNOSIS — Z86.73: ICD-10-CM

## 2019-12-12 DIAGNOSIS — J90: Primary | ICD-10-CM

## 2019-12-12 DIAGNOSIS — J44.9: ICD-10-CM

## 2019-12-12 DIAGNOSIS — I48.91: ICD-10-CM

## 2019-12-12 DIAGNOSIS — I50.9: ICD-10-CM

## 2019-12-12 DIAGNOSIS — N18.9: ICD-10-CM

## 2019-12-12 LAB
ALBUMIN SERPL BCP-MCNC: 3 G/DL (ref 3.4–5)
ALBUMIN/GLOB SERPL: 0.6 {RATIO} (ref 1.1–1.5)
ALP SERPL-CCNC: 112 IU/L (ref 46–116)
ALT SERPL W P-5'-P-CCNC: 16 U/L (ref 12–78)
ANION GAP SERPL CALCULATED.3IONS-SCNC: 1 MMOL/L (ref 8–16)
AST SERPL W P-5'-P-CCNC: 21 U/L (ref 10–37)
BASOPHILS # BLD AUTO: 0 X10'3 (ref 0–0.2)
BASOPHILS NFR BLD AUTO: 0.2 % (ref 0–1)
BILIRUB SERPL-MCNC: 0.7 MG/DL (ref 0.1–1)
BUN SERPL-MCNC: 60 MG/DL (ref 7–18)
BUN/CREAT SERPL: 28.6 (ref 5.4–32)
CALCIUM SERPL-MCNC: 8.5 MG/DL (ref 8.5–10.1)
CHLORIDE SERPL-SCNC: 104 MMOL/L (ref 99–107)
CO2 SERPL-SCNC: 36.2 MMOL/L (ref 24–32)
CREAT SERPL-MCNC: 2.1 MG/DL (ref 0.6–1.1)
EOSINOPHIL # BLD AUTO: 0.2 X10'3 (ref 0–0.9)
EOSINOPHIL NFR BLD AUTO: 5.3 % (ref 0–6)
ERYTHROCYTE [DISTWIDTH] IN BLOOD BY AUTOMATED COUNT: 18.4 % (ref 11.5–14.5)
GFR SERPL CREATININE-BSD FRML MDRD: 30 ML/MIN
GLUCOSE SERPL-MCNC: 116 MG/DL (ref 70–104)
HCT VFR BLD AUTO: 25.6 % (ref 42–52)
HGB BLD-MCNC: 8.2 G/DL (ref 14–17.9)
LYMPHOCYTES # BLD AUTO: 0.2 X10'3 (ref 1.1–4.8)
LYMPHOCYTES NFR BLD AUTO: 5 % (ref 21–51)
MCH RBC QN AUTO: 28.6 PG (ref 27–31)
MCHC RBC AUTO-ENTMCNC: 32 G/DL (ref 33–36.5)
MCV RBC AUTO: 89.5 FL (ref 78–98)
MONOCYTES # BLD AUTO: 0.6 X10'3 (ref 0–0.9)
MONOCYTES NFR BLD AUTO: 13.1 % (ref 2–12)
NEUTROPHILS # BLD AUTO: 3.2 X10'3 (ref 1.8–7.7)
NEUTROPHILS NFR BLD AUTO: 76.4 % (ref 42–75)
PLATELET # BLD AUTO: 119 X10'3 (ref 140–440)
PMV BLD AUTO: 6.2 FL (ref 7.4–10.4)
POTASSIUM SERPL-SCNC: 4.6 MMOL/L (ref 3.5–5.1)
PROT SERPL-MCNC: 7.7 G/DL (ref 6.4–8.2)
RBC # BLD AUTO: 2.86 X10'6 (ref 4.7–6.1)
SODIUM SERPL-SCNC: 141 MMOL/L (ref 135–145)
WBC # BLD AUTO: 4.2 X10'3 (ref 4.5–11)

## 2019-12-12 PROCEDURE — 93005 ELECTROCARDIOGRAM TRACING: CPT

## 2019-12-12 PROCEDURE — 71045 X-RAY EXAM CHEST 1 VIEW: CPT

## 2019-12-12 PROCEDURE — 36415 COLL VENOUS BLD VENIPUNCTURE: CPT

## 2019-12-12 PROCEDURE — 94640 AIRWAY INHALATION TREATMENT: CPT

## 2019-12-12 PROCEDURE — 87040 BLOOD CULTURE FOR BACTERIA: CPT

## 2019-12-12 PROCEDURE — 80053 COMPREHEN METABOLIC PANEL: CPT

## 2019-12-12 PROCEDURE — 96374 THER/PROPH/DIAG INJ IV PUSH: CPT

## 2019-12-12 PROCEDURE — 94760 N-INVAS EAR/PLS OXIMETRY 1: CPT

## 2019-12-12 PROCEDURE — 85025 COMPLETE CBC W/AUTO DIFF WBC: CPT

## 2019-12-12 PROCEDURE — 83605 ASSAY OF LACTIC ACID: CPT

## 2019-12-12 PROCEDURE — 99284 EMERGENCY DEPT VISIT MOD MDM: CPT

## 2019-12-13 ENCOUNTER — HOSPITAL ENCOUNTER (OUTPATIENT)
Dept: HOSPITAL 94 - SSTAY O | Age: 84
Discharge: NURSING, REHAB OR LONG-TERM CARE | End: 2019-12-13
Attending: RADIOLOGY
Payer: MEDICARE

## 2019-12-13 VITALS — BODY MASS INDEX: 27.22 KG/M2 | HEIGHT: 72 IN | WEIGHT: 201 LBS

## 2019-12-13 VITALS — SYSTOLIC BLOOD PRESSURE: 135 MMHG | DIASTOLIC BLOOD PRESSURE: 82 MMHG

## 2019-12-13 VITALS — SYSTOLIC BLOOD PRESSURE: 140 MMHG | DIASTOLIC BLOOD PRESSURE: 86 MMHG

## 2019-12-13 VITALS — DIASTOLIC BLOOD PRESSURE: 68 MMHG | SYSTOLIC BLOOD PRESSURE: 138 MMHG

## 2019-12-13 VITALS — DIASTOLIC BLOOD PRESSURE: 88 MMHG | SYSTOLIC BLOOD PRESSURE: 135 MMHG

## 2019-12-13 VITALS — DIASTOLIC BLOOD PRESSURE: 88 MMHG | SYSTOLIC BLOOD PRESSURE: 139 MMHG

## 2019-12-13 DIAGNOSIS — I50.9: ICD-10-CM

## 2019-12-13 DIAGNOSIS — I48.91: ICD-10-CM

## 2019-12-13 DIAGNOSIS — I13.0: ICD-10-CM

## 2019-12-13 DIAGNOSIS — Z79.899: ICD-10-CM

## 2019-12-13 DIAGNOSIS — J44.9: ICD-10-CM

## 2019-12-13 DIAGNOSIS — Z86.73: ICD-10-CM

## 2019-12-13 DIAGNOSIS — J90: Primary | ICD-10-CM

## 2019-12-13 DIAGNOSIS — N18.9: ICD-10-CM

## 2019-12-13 DIAGNOSIS — R16.0: ICD-10-CM

## 2019-12-13 PROCEDURE — 71045 X-RAY EXAM CHEST 1 VIEW: CPT

## 2019-12-13 PROCEDURE — 76705 ECHO EXAM OF ABDOMEN: CPT

## 2019-12-13 PROCEDURE — 32555 ASPIRATE PLEURA W/ IMAGING: CPT

## 2019-12-21 ENCOUNTER — HOSPITAL ENCOUNTER (EMERGENCY)
Dept: HOSPITAL 94 - ER | Age: 84
LOS: 1 days | Discharge: HOME | End: 2019-12-22
Payer: MEDICARE

## 2019-12-21 VITALS — SYSTOLIC BLOOD PRESSURE: 127 MMHG | DIASTOLIC BLOOD PRESSURE: 65 MMHG

## 2019-12-21 VITALS — WEIGHT: 231.49 LBS | HEIGHT: 72 IN | BODY MASS INDEX: 31.35 KG/M2

## 2019-12-21 DIAGNOSIS — I13.0: ICD-10-CM

## 2019-12-21 DIAGNOSIS — Z79.899: ICD-10-CM

## 2019-12-21 DIAGNOSIS — Z98.890: ICD-10-CM

## 2019-12-21 DIAGNOSIS — K92.2: Primary | ICD-10-CM

## 2019-12-21 DIAGNOSIS — N18.9: ICD-10-CM

## 2019-12-21 DIAGNOSIS — I50.9: ICD-10-CM

## 2019-12-21 DIAGNOSIS — I48.91: ICD-10-CM

## 2019-12-21 DIAGNOSIS — J44.9: ICD-10-CM

## 2019-12-21 DIAGNOSIS — Z86.73: ICD-10-CM

## 2019-12-21 LAB
ALBUMIN SERPL BCP-MCNC: 3.1 G/DL (ref 3.4–5)
ALBUMIN/GLOB SERPL: 0.7 {RATIO} (ref 1.1–1.5)
ALP SERPL-CCNC: 113 IU/L (ref 46–116)
ALT SERPL W P-5'-P-CCNC: 21 U/L (ref 12–78)
ANION GAP SERPL CALCULATED.3IONS-SCNC: 1 MMOL/L (ref 8–16)
ANISOCYTOSIS BLD QL SMEAR: (no result)
APTT PPP: 28 SECONDS (ref 22–32)
AST SERPL W P-5'-P-CCNC: 24 U/L (ref 10–37)
BASOPHILS # BLD AUTO: 0 X10'3 (ref 0–0.2)
BASOPHILS NFR BLD AUTO: 0.4 % (ref 0–1)
BILIRUB SERPL-MCNC: 0.6 MG/DL (ref 0.1–1)
BUN SERPL-MCNC: 60 MG/DL (ref 7–18)
BUN/CREAT SERPL: 29.3 (ref 5.4–32)
CALCIUM SERPL-MCNC: 8.5 MG/DL (ref 8.5–10.1)
CHLORIDE SERPL-SCNC: 101 MMOL/L (ref 99–107)
CLARITY UR: CLEAR
CO2 SERPL-SCNC: 35.8 MMOL/L (ref 24–32)
COLOR UR: (no result)
CREAT SERPL-MCNC: 2.05 MG/DL (ref 0.6–1.1)
EOSINOPHIL # BLD AUTO: 0.2 X10'3 (ref 0–0.9)
EOSINOPHIL NFR BLD AUTO: 4.3 % (ref 0–6)
ERYTHROCYTE [DISTWIDTH] IN BLOOD BY AUTOMATED COUNT: 19.3 % (ref 11.5–14.5)
ERYTHROCYTE [DISTWIDTH] IN BLOOD BY AUTOMATED COUNT: 19.9 % (ref 11.5–14.5)
GFR SERPL CREATININE-BSD FRML MDRD: 31 ML/MIN
GLUCOSE SERPL-MCNC: 99 MG/DL (ref 70–104)
GLUCOSE UR STRIP-MCNC: NEGATIVE MG/DL
HCT VFR BLD AUTO: 26 % (ref 42–52)
HCT VFR BLD AUTO: 26 % (ref 42–52)
HGB BLD-MCNC: 8.3 G/DL (ref 14–17.9)
HGB BLD-MCNC: 8.4 G/DL (ref 14–17.9)
HGB UR QL STRIP: NEGATIVE
KETONES UR STRIP-MCNC: NEGATIVE MG/DL
LEUKOCYTE ESTERASE UR QL STRIP: NEGATIVE
LYMPHOCYTES # BLD AUTO: 0.2 X10'3 (ref 1.1–4.8)
LYMPHOCYTES NFR BLD AUTO: 4.1 % (ref 21–51)
MCH RBC QN AUTO: 29.2 PG (ref 27–31)
MCH RBC QN AUTO: 29.2 PG (ref 27–31)
MCHC RBC AUTO-ENTMCNC: 31.9 G/DL (ref 33–36.5)
MCHC RBC AUTO-ENTMCNC: 32.2 G/DL (ref 33–36.5)
MCV RBC AUTO: 90.5 FL (ref 78–98)
MCV RBC AUTO: 91.4 FL (ref 78–98)
MONOCYTES # BLD AUTO: 0.6 X10'3 (ref 0–0.9)
MONOCYTES NFR BLD AUTO: 12.7 % (ref 2–12)
NEUTROPHILS # BLD AUTO: 3.7 X10'3 (ref 1.8–7.7)
NEUTROPHILS NFR BLD AUTO: 78.5 % (ref 42–75)
NITRITE UR QL STRIP: NEGATIVE
PH UR STRIP: 5.5 [PH] (ref 4.8–8)
PLATELET # BLD AUTO: 100 X10'3 (ref 140–440)
PLATELET # BLD AUTO: 98 X10'3 (ref 140–440)
PLATELET BLD QL SMEAR: (no result)
PMV BLD AUTO: 6.2 FL (ref 7.4–10.4)
PMV BLD AUTO: 6.5 FL (ref 7.4–10.4)
POTASSIUM SERPL-SCNC: 5 MMOL/L (ref 3.5–5.1)
PROT SERPL-MCNC: 7.8 G/DL (ref 6.4–8.2)
PROT UR QL STRIP: NEGATIVE MG/DL
RBC # BLD AUTO: 2.85 X10'6 (ref 4.7–6.1)
RBC # BLD AUTO: 2.87 X10'6 (ref 4.7–6.1)
RBC MORPH BLD: (no result)
SODIUM SERPL-SCNC: 138 MMOL/L (ref 135–145)
SP GR UR STRIP: 1.01 (ref 1–1.03)
URN COLLECT METHOD CLASS: (no result)
UROBILINOGEN UR STRIP-MCNC: 0.2 E.U/DL (ref 0.2–1)
WBC # BLD AUTO: 4.5 X10'3 (ref 4.5–11)
WBC # BLD AUTO: 4.7 X10'3 (ref 4.5–11)

## 2019-12-21 PROCEDURE — 85610 PROTHROMBIN TIME: CPT

## 2019-12-21 PROCEDURE — 99284 EMERGENCY DEPT VISIT MOD MDM: CPT

## 2019-12-21 PROCEDURE — 82272 OCCULT BLD FECES 1-3 TESTS: CPT

## 2019-12-21 PROCEDURE — 86900 BLOOD TYPING SEROLOGIC ABO: CPT

## 2019-12-21 PROCEDURE — 86901 BLOOD TYPING SEROLOGIC RH(D): CPT

## 2019-12-21 PROCEDURE — 36415 COLL VENOUS BLD VENIPUNCTURE: CPT

## 2019-12-21 PROCEDURE — 80053 COMPREHEN METABOLIC PANEL: CPT

## 2019-12-21 PROCEDURE — 86885 COOMBS TEST INDIRECT QUAL: CPT

## 2019-12-21 PROCEDURE — 81003 URINALYSIS AUTO W/O SCOPE: CPT

## 2019-12-21 PROCEDURE — 93005 ELECTROCARDIOGRAM TRACING: CPT

## 2019-12-21 PROCEDURE — 71045 X-RAY EXAM CHEST 1 VIEW: CPT

## 2019-12-21 PROCEDURE — 85027 COMPLETE CBC AUTOMATED: CPT

## 2019-12-21 PROCEDURE — 85025 COMPLETE CBC W/AUTO DIFF WBC: CPT

## 2019-12-21 PROCEDURE — 85730 THROMBOPLASTIN TIME PARTIAL: CPT

## 2019-12-21 PROCEDURE — 74176 CT ABD & PELVIS W/O CONTRAST: CPT

## 2019-12-23 ENCOUNTER — HOSPITAL ENCOUNTER (OUTPATIENT)
Dept: HOSPITAL 94 - SSTAY O | Age: 84
Discharge: NURSING, REHAB OR LONG-TERM CARE | End: 2019-12-23
Attending: RADIOLOGY
Payer: MEDICARE

## 2019-12-23 VITALS — HEIGHT: 72 IN | BODY MASS INDEX: 31.56 KG/M2 | WEIGHT: 233.03 LBS

## 2019-12-23 VITALS — DIASTOLIC BLOOD PRESSURE: 82 MMHG | SYSTOLIC BLOOD PRESSURE: 135 MMHG

## 2019-12-23 DIAGNOSIS — R60.1: Primary | ICD-10-CM

## 2019-12-23 DIAGNOSIS — Z86.73: ICD-10-CM

## 2019-12-23 DIAGNOSIS — I50.9: ICD-10-CM

## 2019-12-23 DIAGNOSIS — J44.9: ICD-10-CM

## 2019-12-23 DIAGNOSIS — N18.5: ICD-10-CM

## 2019-12-23 DIAGNOSIS — Z79.899: ICD-10-CM

## 2019-12-23 DIAGNOSIS — I48.91: ICD-10-CM

## 2019-12-23 DIAGNOSIS — I13.2: ICD-10-CM

## 2019-12-23 DIAGNOSIS — K74.60: ICD-10-CM

## 2019-12-23 PROCEDURE — 76705 ECHO EXAM OF ABDOMEN: CPT

## 2019-12-30 LAB — HEMOCCULT STL QL: POSITIVE
